# Patient Record
Sex: MALE | Race: WHITE
[De-identification: names, ages, dates, MRNs, and addresses within clinical notes are randomized per-mention and may not be internally consistent; named-entity substitution may affect disease eponyms.]

---

## 2017-04-17 ENCOUNTER — RX ONLY (RX ONLY)
Age: 37
End: 2017-04-17

## 2017-04-17 ENCOUNTER — DOCTOR'S OFFICE (OUTPATIENT)
Dept: URBAN - METROPOLITAN AREA CLINIC 137 | Facility: CLINIC | Age: 37
Setting detail: OPHTHALMOLOGY
End: 2017-04-17
Payer: COMMERCIAL

## 2017-04-17 DIAGNOSIS — H52.03: ICD-10-CM

## 2017-04-17 PROCEDURE — 92004 COMPRE OPH EXAM NEW PT 1/>: CPT | Performed by: OPHTHALMOLOGY

## 2017-04-17 ASSESSMENT — REFRACTION_OUTSIDERX
OU_VA: 20/
OD_VA3: 20/
OS_VA2: 20/20(J1+)
OD_CYLINDER: +0.50
OD_VA2: 20/20(J1+)
OD_VA1: 20/20
OD_SPHERE: +0.75
OS_VA3: 20/
OS_CYLINDER: +0.50
OS_AXIS: 135
OS_VA1: 20/20
OS_SPHERE: +1.75
OD_AXIS: 045

## 2017-04-17 ASSESSMENT — AXIALLENGTH_DERIVED
OD_AL: 23.3877
OS_AL: 22.8376

## 2017-04-17 ASSESSMENT — CONFRONTATIONAL VISUAL FIELD TEST (CVF)
OD_FINDINGS: FULL
OS_FINDINGS: FULL

## 2017-04-17 ASSESSMENT — VISUAL ACUITY
OS_BCVA: 20/30-2
OD_BCVA: 20/25-2

## 2017-04-17 ASSESSMENT — REFRACTION_CURRENTRX
OS_OVR_VA: 20/
OD_OVR_VA: 20/
OS_OVR_VA: 20/
OD_OVR_VA: 20/
OS_OVR_VA: 20/
OD_OVR_VA: 20/

## 2017-04-17 ASSESSMENT — REFRACTION_AUTOREFRACTION
OD_CYLINDER: +0.50
OS_AXIS: 134
OS_SPHERE: +1.75
OD_SPHERE: +0.75
OD_AXIS: 043
OS_CYLINDER: +0.50

## 2017-04-17 ASSESSMENT — KERATOMETRY
OS_K1POWER_DIOPTERS: 43.00
OS_AXISANGLE_DEGREES: 125
OD_AXISANGLE_DEGREES: 061
OD_K1POWER_DIOPTERS: 42.50
OD_K2POWER_DIOPTERS: 43.50
OS_K2POWER_DIOPTERS: 44.00

## 2017-04-17 ASSESSMENT — REFRACTION_MANIFEST
OS_VA2: 20/
OD_VA1: 20/
OS_VA1: 20/
OU_VA: 20/
OU_VA: 20/
OD_VA1: 20/
OD_VA2: 20/
OD_VA2: 20/
OD_VA3: 20/
OS_VA1: 20/
OS_VA3: 20/
OD_VA3: 20/
OS_VA2: 20/
OS_VA3: 20/

## 2017-04-17 ASSESSMENT — SPHEQUIV_DERIVED
OS_SPHEQUIV: 2
OD_SPHEQUIV: 1

## 2019-03-08 ENCOUNTER — APPOINTMENT (EMERGENCY)
Dept: CT IMAGING | Facility: HOSPITAL | Age: 39
DRG: 392 | End: 2019-03-08
Payer: COMMERCIAL

## 2019-03-08 ENCOUNTER — HOSPITAL ENCOUNTER (INPATIENT)
Facility: HOSPITAL | Age: 39
LOS: 5 days | Discharge: HOME | DRG: 392 | End: 2019-03-13
Attending: EMERGENCY MEDICINE | Admitting: SURGERY
Payer: COMMERCIAL

## 2019-03-08 DIAGNOSIS — K63.1 PERFORATED SIGMOID COLON (HCC): ICD-10-CM

## 2019-03-08 DIAGNOSIS — K57.20 DIVERTICULITIS OF LARGE INTESTINE WITH PERFORATION WITHOUT BLEEDING: Primary | ICD-10-CM

## 2019-03-08 DIAGNOSIS — R10.9 ABDOMINAL PAIN: ICD-10-CM

## 2019-03-08 DIAGNOSIS — K57.92 DIVERTICULITIS: ICD-10-CM

## 2019-03-08 LAB
ALBUMIN SERPL BCP-MCNC: 3.1 G/DL (ref 3.5–5)
ALP SERPL-CCNC: 54 U/L (ref 46–116)
ALT SERPL W P-5'-P-CCNC: 27 U/L (ref 12–78)
ANION GAP SERPL CALCULATED.3IONS-SCNC: 7 MMOL/L (ref 4–13)
AST SERPL W P-5'-P-CCNC: 13 U/L (ref 5–45)
BACTERIA UR QL AUTO: ABNORMAL /HPF
BASOPHILS # BLD AUTO: 0.04 THOUSANDS/ΜL (ref 0–0.1)
BASOPHILS NFR BLD AUTO: 0 % (ref 0–1)
BILIRUB SERPL-MCNC: 0.5 MG/DL (ref 0.2–1)
BILIRUB UR QL STRIP: NEGATIVE
BUN SERPL-MCNC: 15 MG/DL (ref 5–25)
CALCIUM SERPL-MCNC: 8.7 MG/DL (ref 8.3–10.1)
CHLORIDE SERPL-SCNC: 102 MMOL/L (ref 100–108)
CLARITY UR: CLEAR
CO2 SERPL-SCNC: 29 MMOL/L (ref 21–32)
COLOR UR: YELLOW
CREAT SERPL-MCNC: 0.82 MG/DL (ref 0.6–1.3)
EOSINOPHIL # BLD AUTO: 0.28 THOUSAND/ΜL (ref 0–0.61)
EOSINOPHIL NFR BLD AUTO: 2 % (ref 0–6)
ERYTHROCYTE [DISTWIDTH] IN BLOOD BY AUTOMATED COUNT: 14.2 % (ref 11.6–15.1)
GFR SERPL CREATININE-BSD FRML MDRD: 111 ML/MIN/1.73SQ M
GLUCOSE SERPL-MCNC: 105 MG/DL (ref 65–140)
GLUCOSE UR STRIP-MCNC: NEGATIVE MG/DL
HCT VFR BLD AUTO: 43.3 % (ref 36.5–49.3)
HGB BLD-MCNC: 14.3 G/DL (ref 12–17)
HGB UR QL STRIP.AUTO: ABNORMAL
IMM GRANULOCYTES # BLD AUTO: 0.06 THOUSAND/UL (ref 0–0.2)
IMM GRANULOCYTES NFR BLD AUTO: 0 % (ref 0–2)
KETONES UR STRIP-MCNC: NEGATIVE MG/DL
LEUKOCYTE ESTERASE UR QL STRIP: NEGATIVE
LIPASE SERPL-CCNC: 73 U/L (ref 73–393)
LYMPHOCYTES # BLD AUTO: 1.22 THOUSANDS/ΜL (ref 0.6–4.47)
LYMPHOCYTES NFR BLD AUTO: 9 % (ref 14–44)
MCH RBC QN AUTO: 30.6 PG (ref 26.8–34.3)
MCHC RBC AUTO-ENTMCNC: 33 G/DL (ref 31.4–37.4)
MCV RBC AUTO: 93 FL (ref 82–98)
MONOCYTES # BLD AUTO: 1.05 THOUSAND/ΜL (ref 0.17–1.22)
MONOCYTES NFR BLD AUTO: 8 % (ref 4–12)
NEUTROPHILS # BLD AUTO: 11.4 THOUSANDS/ΜL (ref 1.85–7.62)
NEUTS SEG NFR BLD AUTO: 81 % (ref 43–75)
NITRITE UR QL STRIP: NEGATIVE
NON-SQ EPI CELLS URNS QL MICRO: ABNORMAL /HPF
NRBC BLD AUTO-RTO: 0 /100 WBCS
PH UR STRIP.AUTO: 6.5 [PH]
PLATELET # BLD AUTO: 297 THOUSANDS/UL (ref 149–390)
PMV BLD AUTO: 9.6 FL (ref 8.9–12.7)
POTASSIUM SERPL-SCNC: 3.9 MMOL/L (ref 3.5–5.3)
PROT SERPL-MCNC: 7 G/DL (ref 6.4–8.2)
PROT UR STRIP-MCNC: NEGATIVE MG/DL
RBC # BLD AUTO: 4.67 MILLION/UL (ref 3.88–5.62)
RBC #/AREA URNS AUTO: ABNORMAL /HPF
SODIUM SERPL-SCNC: 138 MMOL/L (ref 136–145)
SP GR UR STRIP.AUTO: 1.01 (ref 1–1.03)
UROBILINOGEN UR QL STRIP.AUTO: 0.2 E.U./DL
WBC # BLD AUTO: 14.05 THOUSAND/UL (ref 4.31–10.16)
WBC #/AREA URNS AUTO: ABNORMAL /HPF

## 2019-03-08 PROCEDURE — 81001 URINALYSIS AUTO W/SCOPE: CPT | Performed by: EMERGENCY MEDICINE

## 2019-03-08 PROCEDURE — 85025 COMPLETE CBC W/AUTO DIFF WBC: CPT | Performed by: EMERGENCY MEDICINE

## 2019-03-08 PROCEDURE — 96374 THER/PROPH/DIAG INJ IV PUSH: CPT

## 2019-03-08 PROCEDURE — 74177 CT ABD & PELVIS W/CONTRAST: CPT

## 2019-03-08 PROCEDURE — 80053 COMPREHEN METABOLIC PANEL: CPT | Performed by: EMERGENCY MEDICINE

## 2019-03-08 PROCEDURE — 96361 HYDRATE IV INFUSION ADD-ON: CPT

## 2019-03-08 PROCEDURE — 83690 ASSAY OF LIPASE: CPT | Performed by: EMERGENCY MEDICINE

## 2019-03-08 PROCEDURE — 36415 COLL VENOUS BLD VENIPUNCTURE: CPT | Performed by: EMERGENCY MEDICINE

## 2019-03-08 PROCEDURE — 99285 EMERGENCY DEPT VISIT HI MDM: CPT

## 2019-03-08 RX ORDER — HYDROMORPHONE HCL/PF 1 MG/ML
0.5 SYRINGE (ML) INJECTION
Status: DISCONTINUED | OUTPATIENT
Start: 2019-03-08 | End: 2019-03-13 | Stop reason: HOSPADM

## 2019-03-08 RX ORDER — ONDANSETRON 2 MG/ML
4 INJECTION INTRAMUSCULAR; INTRAVENOUS EVERY 6 HOURS PRN
Status: DISCONTINUED | OUTPATIENT
Start: 2019-03-08 | End: 2019-03-13 | Stop reason: HOSPADM

## 2019-03-08 RX ORDER — HYDROMORPHONE HCL/PF 1 MG/ML
1 SYRINGE (ML) INJECTION
Status: DISCONTINUED | OUTPATIENT
Start: 2019-03-08 | End: 2019-03-13 | Stop reason: HOSPADM

## 2019-03-08 RX ORDER — NICOTINE 21 MG/24HR
1 PATCH, TRANSDERMAL 24 HOURS TRANSDERMAL DAILY
Status: DISCONTINUED | OUTPATIENT
Start: 2019-03-08 | End: 2019-03-13 | Stop reason: HOSPADM

## 2019-03-08 RX ORDER — ACETAMINOPHEN 325 MG/1
650 TABLET ORAL EVERY 6 HOURS PRN
Status: DISCONTINUED | OUTPATIENT
Start: 2019-03-08 | End: 2019-03-13 | Stop reason: HOSPADM

## 2019-03-08 RX ORDER — CEFAZOLIN SODIUM 2 G/50ML
2000 SOLUTION INTRAVENOUS ONCE
Status: DISCONTINUED | OUTPATIENT
Start: 2019-03-08 | End: 2019-03-08

## 2019-03-08 RX ORDER — HYDROMORPHONE HCL/PF 1 MG/ML
1 SYRINGE (ML) INJECTION ONCE
Status: COMPLETED | OUTPATIENT
Start: 2019-03-08 | End: 2019-03-08

## 2019-03-08 RX ORDER — SODIUM CHLORIDE, SODIUM LACTATE, POTASSIUM CHLORIDE, CALCIUM CHLORIDE 600; 310; 30; 20 MG/100ML; MG/100ML; MG/100ML; MG/100ML
100 INJECTION, SOLUTION INTRAVENOUS CONTINUOUS
Status: DISCONTINUED | OUTPATIENT
Start: 2019-03-08 | End: 2019-03-13 | Stop reason: HOSPADM

## 2019-03-08 RX ADMIN — NICOTINE 1 PATCH: 14 PATCH TRANSDERMAL at 16:13

## 2019-03-08 RX ADMIN — SODIUM CHLORIDE, SODIUM LACTATE, POTASSIUM CHLORIDE, AND CALCIUM CHLORIDE 125 ML/HR: .6; .31; .03; .02 INJECTION, SOLUTION INTRAVENOUS at 16:14

## 2019-03-08 RX ADMIN — HYDROMORPHONE HYDROCHLORIDE 1 MG: 1 INJECTION, SOLUTION INTRAMUSCULAR; INTRAVENOUS; SUBCUTANEOUS at 19:24

## 2019-03-08 RX ADMIN — HYDROMORPHONE HYDROCHLORIDE 1 MG: 1 INJECTION, SOLUTION INTRAMUSCULAR; INTRAVENOUS; SUBCUTANEOUS at 13:59

## 2019-03-08 RX ADMIN — SODIUM CHLORIDE 1000 ML: 0.9 INJECTION, SOLUTION INTRAVENOUS at 13:21

## 2019-03-08 RX ADMIN — IOHEXOL 100 ML: 350 INJECTION, SOLUTION INTRAVENOUS at 14:11

## 2019-03-08 RX ADMIN — SODIUM CHLORIDE 3 G: 9 INJECTION, SOLUTION INTRAVENOUS at 20:13

## 2019-03-08 RX ADMIN — ENOXAPARIN SODIUM 40 MG: 40 INJECTION SUBCUTANEOUS at 16:13

## 2019-03-08 RX ADMIN — CEFAZOLIN SODIUM 2000 MG: 2 SOLUTION INTRAVENOUS at 16:52

## 2019-03-08 RX ADMIN — METRONIDAZOLE 500 MG: 500 INJECTION, SOLUTION INTRAVENOUS at 15:31

## 2019-03-08 NOTE — H&P
History and Physical -General Surgery  Clark Chase 35 y o  male MRN: [id number]  Unit/Bed#: ED 29 Encounter: [id number]        Reason for Consult / Principal Problem: abdominal pain    HPI: Clark Chase is a 35y o  year old male with no significant pmh who presents with abdominal pain  Pt states he began having nausea and generalized abdominal pain on Sunday  Has also been having diarrhea  Continued with nausea throughout the week until Thursday when pain stopped  He did have temps of 101 1F this week  Appetite has been low  Pain returned this morning in LLQ  Has also been having burning with urination along with feeling of not emptying  Does state he's been having crampy abdominal pain for a few months  He has never had a colonoscopy  Has never been told he had diverticulitis  Denies abdominal surgical history  Review of Systems   Constitutional: Positive for appetite change and fever  HENT: Negative for congestion and sore throat  Gastrointestinal: Positive for abdominal pain, diarrhea and nausea  Negative for abdominal distention and constipation  Genitourinary: Positive for difficulty urinating  Skin: Negative for rash and wound  Hematological: Does not bruise/bleed easily  Psychiatric/Behavioral: Negative for behavioral problems, confusion and decreased concentration  Historical Information   History reviewed  No pertinent past medical history  History reviewed  No pertinent surgical history  Social History   Social History     Substance and Sexual Activity   Alcohol Use Yes    Comment: social     Social History     Substance and Sexual Activity   Drug Use Never     Social History     Tobacco Use   Smoking Status Current Every Day Smoker   Smokeless Tobacco Never Used     History reviewed  No pertinent family history      Meds/Allergies       (Not in a hospital admission)  Current Facility-Administered Medications   Medication Dose Route Frequency    ceFAZolin (ANCEF) IVPB (premix) 2,000 mg  2,000 mg Intravenous Once    metroNIDAZOLE (FLAGYL) IVPB (premix) 500 mg  500 mg Intravenous Once       No Known Allergies    Objective     Blood pressure 146/82, pulse 92, temperature 99 3 °F (37 4 °C), resp  rate 18, weight 120 kg (263 lb 10 7 oz), SpO2 98 %    No intake or output data in the 24 hours ending 03/08/19 1546    PHYSICAL EXAM  General appearance: alert and oriented, in no acute distress  Skin: Skin color, texture, turgor normal  No rashes or lesions  Heart: regular rate and rhythm, S1, S2 normal, no murmur, click, rub or gallop  Lungs: clear to auscultation bilaterally  Abdomen: soft, bowel sounds +, very tender to palpation LLQ  Neurological: aaox3, speech normal    Lab Results:   Admission on 03/08/2019   Component Date Value    Sodium 03/08/2019 138     Potassium 03/08/2019 3 9     Chloride 03/08/2019 102     CO2 03/08/2019 29     ANION GAP 03/08/2019 7     BUN 03/08/2019 15     Creatinine 03/08/2019 0 82     Glucose 03/08/2019 105     Calcium 03/08/2019 8 7     AST 03/08/2019 13     ALT 03/08/2019 27     Alkaline Phosphatase 03/08/2019 54     Total Protein 03/08/2019 7 0     Albumin 03/08/2019 3 1*    Total Bilirubin 03/08/2019 0 50     eGFR 03/08/2019 111     WBC 03/08/2019 14 05*    RBC 03/08/2019 4 67     Hemoglobin 03/08/2019 14 3     Hematocrit 03/08/2019 43 3     MCV 03/08/2019 93     MCH 03/08/2019 30 6     MCHC 03/08/2019 33 0     RDW 03/08/2019 14 2     MPV 03/08/2019 9 6     Platelets 81/12/2630 297     nRBC 03/08/2019 0     Neutrophils Relative 03/08/2019 81*    Immat GRANS % 03/08/2019 0     Lymphocytes Relative 03/08/2019 9*    Monocytes Relative 03/08/2019 8     Eosinophils Relative 03/08/2019 2     Basophils Relative 03/08/2019 0     Neutrophils Absolute 03/08/2019 11 40*    Immature Grans Absolute 03/08/2019 0 06     Lymphocytes Absolute 03/08/2019 1 22     Monocytes Absolute 03/08/2019 1 05     Eosinophils Absolute 03/08/2019 0 28     Basophils Absolute 03/08/2019 0 04     Lipase 03/08/2019 73      Imaging Studies: I have personally reviewed pertinent reports  ASSESSMENT/PLAN:    35year old male presenting with diverticulitis with perforation, no abscess collection    - IV Unasyn  - pain control  - NPO/IVF  - OOB as able  - tylenol for fevers      This patient will require  2 night hospital stay  Counseling / Coordination of Care  Total time spent today  20 minutes  Greater than 50% of total time was spent with the patient and / or family counseling and / or coordination of care       Grzegorz Gilliland PA-C

## 2019-03-08 NOTE — ED PROVIDER NOTES
History  Chief Complaint   Patient presents with    Abdominal Pain     generalized abd pain X 5 days , sent from Urgent Care for further evaluation, difficulty urinating and contstipated     Patient presents to the emergency department after being seen at a local urgent care facility for evaluation of increasing left lower quadrant pain that began on Sunday  Patient denies nausea vomiting diarrhea  Denies urinary symptoms  Denies fever chills or rash  Denies chest pain sob  Denies trauma fall injury or new activity  Denies ill contacts  Denies melena or rectal bleeding  Denies urinary symptoms or penile discharge  None       History reviewed  No pertinent past medical history  History reviewed  No pertinent surgical history  History reviewed  No pertinent family history  I have reviewed and agree with the history as documented  Social History     Tobacco Use    Smoking status: Current Every Day Smoker    Smokeless tobacco: Never Used   Substance Use Topics    Alcohol use: Yes     Comment: social    Drug use: Never        Review of Systems   Constitutional: Negative  Negative for activity change, appetite change, chills, diaphoresis, fatigue and fever  HENT: Negative  Negative for congestion, drooling, ear discharge, ear pain, rhinorrhea, sinus pressure, sinus pain, tinnitus, trouble swallowing and voice change  Eyes: Negative  Negative for photophobia and visual disturbance  Respiratory: Negative  Negative for cough, chest tightness, shortness of breath and stridor  Cardiovascular: Negative  Negative for chest pain, palpitations and leg swelling  Gastrointestinal: Positive for abdominal pain  Negative for abdominal distention, anal bleeding, blood in stool, constipation, diarrhea, nausea, rectal pain and vomiting  Endocrine: Negative  Genitourinary: Negative    Negative for decreased urine volume, difficulty urinating, discharge, dysuria, flank pain, frequency, hematuria, penile pain, penile swelling, scrotal swelling, testicular pain and urgency  Musculoskeletal: Negative  Negative for arthralgias, back pain, neck pain and neck stiffness  Skin: Negative  Negative for rash and wound  Allergic/Immunologic: Negative  Neurological: Negative  Negative for dizziness, tremors, seizures, syncope, facial asymmetry, speech difficulty, weakness, light-headedness, numbness and headaches  Hematological: Negative  Does not bruise/bleed easily  Psychiatric/Behavioral: Negative  Negative for confusion  Physical Exam  Physical Exam   Constitutional: He is oriented to person, place, and time  He appears well-developed and well-nourished  Non-toxic appearance  He does not appear ill  No distress  HENT:   Head: Normocephalic and atraumatic  Right Ear: External ear normal    Left Ear: External ear normal    Mouth/Throat: Oropharynx is clear and moist  No oropharyngeal exudate  Eyes: Pupils are equal, round, and reactive to light  Conjunctivae and EOM are normal  No scleral icterus  Cardiovascular: Normal rate, regular rhythm, normal heart sounds and intact distal pulses  Exam reveals no gallop and no friction rub  No murmur heard  Pulmonary/Chest: Effort normal and breath sounds normal  No stridor  No respiratory distress  He has no wheezes  He has no rhonchi  He has no rales  He exhibits no tenderness  Abdominal: Soft  Normal appearance and bowel sounds are normal  He exhibits no shifting dullness, no distension, no pulsatile liver, no fluid wave, no abdominal bruit, no ascites, no pulsatile midline mass and no mass  There is no hepatosplenomegaly, splenomegaly or hepatomegaly  There is tenderness in the left lower quadrant  There is no rigidity, no rebound, no guarding, no CVA tenderness, no tenderness at McBurney's point and negative Breaux's sign  No hernia  Moderate left lower quadrant tenderness to palpation    No masses hernias or peritoneal signs Musculoskeletal: Normal range of motion  Neurological: He is alert and oriented to person, place, and time  He has normal reflexes  Skin: Skin is warm and dry  No rash noted  He is not diaphoretic  No cyanosis or erythema  No pallor  Psychiatric: He has a normal mood and affect  His behavior is normal    Nursing note and vitals reviewed        Vital Signs  ED Triage Vitals [03/08/19 1244]   Temperature Pulse Respirations Blood Pressure SpO2   99 3 °F (37 4 °C) 92 18 146/82 98 %      Temp src Heart Rate Source Patient Position - Orthostatic VS BP Location FiO2 (%)   -- -- -- Right arm --      Pain Score       8           Vitals:    03/08/19 1244   BP: 146/82   Pulse: 92       Visual Acuity      ED Medications  Medications   metroNIDAZOLE (FLAGYL) IVPB (premix) 500 mg (has no administration in time range)   ceFAZolin (ANCEF) IVPB (premix) 2,000 mg (has no administration in time range)   sodium chloride 0 9 % bolus 1,000 mL (0 mL Intravenous Stopped 3/8/19 1421)   HYDROmorphone (DILAUDID) injection 1 mg (1 mg Intravenous Given 3/8/19 1359)   iohexol (OMNIPAQUE) 350 MG/ML injection (MULTI-DOSE) 100 mL (100 mL Intravenous Given 3/8/19 1411)       Diagnostic Studies  Results Reviewed     Procedure Component Value Units Date/Time    Comprehensive metabolic panel [[id number]]  (Abnormal) Collected:  03/08/19 1321    Lab Status:  Final result Specimen:  Blood from Arm, Right Updated:  03/08/19 1346     Sodium 138 mmol/L      Potassium 3 9 mmol/L      Chloride 102 mmol/L      CO2 29 mmol/L      ANION GAP 7 mmol/L      BUN 15 mg/dL      Creatinine 0 82 mg/dL      Glucose 105 mg/dL      Calcium 8 7 mg/dL      AST 13 U/L      ALT 27 U/L      Alkaline Phosphatase 54 U/L      Total Protein 7 0 g/dL      Albumin 3 1 g/dL      Total Bilirubin 0 50 mg/dL      eGFR 111 ml/min/1 73sq m     Narrative:       National Kidney Disease Education Program recommendations are as follows:  GFR calculation is accurate only with a steady state creatinine  Chronic Kidney disease less than 60 ml/min/1 73 sq  meters  Kidney failure less than 15 ml/min/1 73 sq  meters  Lipase [[id number]]  (Normal) Collected:  03/08/19 1321    Lab Status:  Final result Specimen:  Blood from Arm, Right Updated:  03/08/19 1346     Lipase 73 u/L     CBC and differential [[id number]]  (Abnormal) Collected:  03/08/19 1321    Lab Status:  Final result Specimen:  Blood from Arm, Right Updated:  03/08/19 1326     WBC 14 05 Thousand/uL      RBC 4 67 Million/uL      Hemoglobin 14 3 g/dL      Hematocrit 43 3 %      MCV 93 fL      MCH 30 6 pg      MCHC 33 0 g/dL      RDW 14 2 %      MPV 9 6 fL      Platelets 447 Thousands/uL      nRBC 0 /100 WBCs      Neutrophils Relative 81 %      Immat GRANS % 0 %      Lymphocytes Relative 9 %      Monocytes Relative 8 %      Eosinophils Relative 2 %      Basophils Relative 0 %      Neutrophils Absolute 11 40 Thousands/µL      Immature Grans Absolute 0 06 Thousand/uL      Lymphocytes Absolute 1 22 Thousands/µL      Monocytes Absolute 1 05 Thousand/µL      Eosinophils Absolute 0 28 Thousand/µL      Basophils Absolute 0 04 Thousands/µL     UA w Reflex to Microscopic [[id number]]     Lab Status:  No result Specimen:  Urine                  CT abdomen pelvis with contrast    (Results Pending)              Procedures  Procedures       Phone Contacts  ED Phone Contact    ED Course  ED Course as of Mar 08 1521   Fri Mar 08, 2019   1510 Discussed case with Ford Duff the surgical resident who will evaluate patient in the emergency department  Patient will receive IV antibiotics and be admitted  1518 Patient will be admitted to Dr Oliver's service                                     MDM    Disposition  Final diagnoses:   Abdominal pain   Diverticulitis   Perforated sigmoid colon ([facility] [address] )     Time reflects when diagnosis was documented in both MDM as applicable and the Disposition within this note     Time User Action Codes Description Comment    3/8/2019 3:19 PM Juan Alberto Gipson Add [R10 9] Abdominal pain     3/8/2019  3:19 PM Peter Alexander Add [K57 92] Diverticulitis     3/8/2019  3:19 PM Kaitlyn Gipson Add [K63 1] Perforated sigmoid colon [facility])       ED Disposition     ED Disposition Condition Date/Time Comment    Admit Stable Fri Mar 8, 2019  3:19 PM Case was discussed with Cornell Mahaarj Surgical Residentand the patient's admission status was agreed to be Admission Status: inpatient status to the service of Dr Toby Lima    None         Patient's Medications    No medications on file     No discharge procedures on file      ED Provider  Electronically Signed by           Lawrence Feliz MD  03/08/19 1595

## 2019-03-08 NOTE — ED NOTES
Pt states he cannot provide urine sample at this time, however gave sample to  earlier, results in chart        Gretchen Cardenas, PHANI  03/08/19 6943

## 2019-03-09 LAB
ANION GAP SERPL CALCULATED.3IONS-SCNC: 10 MMOL/L (ref 4–13)
BUN SERPL-MCNC: 9 MG/DL (ref 5–25)
CALCIUM SERPL-MCNC: 8.5 MG/DL (ref 8.3–10.1)
CHLORIDE SERPL-SCNC: 102 MMOL/L (ref 100–108)
CO2 SERPL-SCNC: 26 MMOL/L (ref 21–32)
CREAT SERPL-MCNC: 0.79 MG/DL (ref 0.6–1.3)
ERYTHROCYTE [DISTWIDTH] IN BLOOD BY AUTOMATED COUNT: 14.2 % (ref 11.6–15.1)
GFR SERPL CREATININE-BSD FRML MDRD: 112 ML/MIN/1.73SQ M
GLUCOSE SERPL-MCNC: 108 MG/DL (ref 65–140)
HCT VFR BLD AUTO: 40.3 % (ref 36.5–49.3)
HGB BLD-MCNC: 13.5 G/DL (ref 12–17)
MCH RBC QN AUTO: 30.4 PG (ref 26.8–34.3)
MCHC RBC AUTO-ENTMCNC: 33.5 G/DL (ref 31.4–37.4)
MCV RBC AUTO: 91 FL (ref 82–98)
PLATELET # BLD AUTO: 280 THOUSANDS/UL (ref 149–390)
PMV BLD AUTO: 9.8 FL (ref 8.9–12.7)
POTASSIUM SERPL-SCNC: 3.4 MMOL/L (ref 3.5–5.3)
RBC # BLD AUTO: 4.44 MILLION/UL (ref 3.88–5.62)
SODIUM SERPL-SCNC: 138 MMOL/L (ref 136–145)
WBC # BLD AUTO: 11.68 THOUSAND/UL (ref 4.31–10.16)

## 2019-03-09 PROCEDURE — 85027 COMPLETE CBC AUTOMATED: CPT | Performed by: PHYSICIAN ASSISTANT

## 2019-03-09 PROCEDURE — 80048 BASIC METABOLIC PNL TOTAL CA: CPT | Performed by: PHYSICIAN ASSISTANT

## 2019-03-09 RX ORDER — POTASSIUM CHLORIDE 20 MEQ/1
40 TABLET, EXTENDED RELEASE ORAL ONCE
Status: COMPLETED | OUTPATIENT
Start: 2019-03-09 | End: 2019-03-09

## 2019-03-09 RX ADMIN — SODIUM CHLORIDE 3 G: 9 INJECTION, SOLUTION INTRAVENOUS at 02:31

## 2019-03-09 RX ADMIN — HYDROMORPHONE HYDROCHLORIDE 1 MG: 1 INJECTION, SOLUTION INTRAMUSCULAR; INTRAVENOUS; SUBCUTANEOUS at 14:26

## 2019-03-09 RX ADMIN — SODIUM CHLORIDE 3 G: 9 INJECTION, SOLUTION INTRAVENOUS at 14:25

## 2019-03-09 RX ADMIN — SODIUM CHLORIDE, SODIUM LACTATE, POTASSIUM CHLORIDE, AND CALCIUM CHLORIDE 125 ML/HR: .6; .31; .03; .02 INJECTION, SOLUTION INTRAVENOUS at 09:19

## 2019-03-09 RX ADMIN — SODIUM CHLORIDE 3 G: 9 INJECTION, SOLUTION INTRAVENOUS at 19:34

## 2019-03-09 RX ADMIN — SODIUM CHLORIDE 3 G: 9 INJECTION, SOLUTION INTRAVENOUS at 09:15

## 2019-03-09 RX ADMIN — HYDROMORPHONE HYDROCHLORIDE 1 MG: 1 INJECTION, SOLUTION INTRAMUSCULAR; INTRAVENOUS; SUBCUTANEOUS at 00:34

## 2019-03-09 RX ADMIN — SODIUM CHLORIDE, SODIUM LACTATE, POTASSIUM CHLORIDE, AND CALCIUM CHLORIDE 125 ML/HR: .6; .31; .03; .02 INJECTION, SOLUTION INTRAVENOUS at 01:58

## 2019-03-09 RX ADMIN — SODIUM CHLORIDE, SODIUM LACTATE, POTASSIUM CHLORIDE, AND CALCIUM CHLORIDE 125 ML/HR: .6; .31; .03; .02 INJECTION, SOLUTION INTRAVENOUS at 17:53

## 2019-03-09 RX ADMIN — NICOTINE 1 PATCH: 14 PATCH TRANSDERMAL at 09:18

## 2019-03-09 RX ADMIN — HYDROMORPHONE HYDROCHLORIDE 1 MG: 1 INJECTION, SOLUTION INTRAMUSCULAR; INTRAVENOUS; SUBCUTANEOUS at 11:35

## 2019-03-09 RX ADMIN — HYDROMORPHONE HYDROCHLORIDE 1 MG: 1 INJECTION, SOLUTION INTRAMUSCULAR; INTRAVENOUS; SUBCUTANEOUS at 17:52

## 2019-03-09 RX ADMIN — POTASSIUM CHLORIDE 40 MEQ: 1500 TABLET, EXTENDED RELEASE ORAL at 09:15

## 2019-03-09 RX ADMIN — HYDROMORPHONE HYDROCHLORIDE 1 MG: 1 INJECTION, SOLUTION INTRAMUSCULAR; INTRAVENOUS; SUBCUTANEOUS at 06:43

## 2019-03-09 RX ADMIN — ENOXAPARIN SODIUM 40 MG: 40 INJECTION SUBCUTANEOUS at 09:16

## 2019-03-09 RX ADMIN — HYDROMORPHONE HYDROCHLORIDE 1 MG: 1 INJECTION, SOLUTION INTRAMUSCULAR; INTRAVENOUS; SUBCUTANEOUS at 21:35

## 2019-03-09 NOTE — UTILIZATION REVIEW
Initial Clinical Review    Admission: Date/Time/Statement: Inpatient 3/8/19 @ 1520   Orders Placed This Encounter   Procedures    Inpatient Admission     Standing Status:   Standing     Number of Occurrences:   1     Order Specific Question:   Admitting Physician     Answer:   Floyd Macias [[id number]]     Order Specific Question:   Level of Care     Answer:   Med Surg [[id number]]     Order Specific Question:   Estimated length of stay     Answer:   More than 2 Midnights     Order Specific Question:   Certification     Answer:   I certify that inpatient services are medically necessary for this patient for a duration of greater than two midnights  See H&P and MD Progress Notes for additional information about the patient's course of treatment  ED: Date/Time/Mode of Arrival:   ED Arrival Information     Expected Arrival Acuity Means of Arrival Escorted By Service Admission Type    - 3/8/2019 12:25 Urgent Walk-In Family Member Surgery-General Urgent    Arrival Complaint    Abdominal Pain        Chief Complaint:   Chief Complaint   Patient presents with    Abdominal Pain     generalized abd pain X 5 days , sent from Urgent Care for further evaluation, difficulty urinating and contstipated     Assessment/Plan: 35 y o  Male with abdominal pain for 5 days  Sent from Urgent Care facility  Continues with nausea through week, poor PO intake  This am acutely pain to Left lower quadrant returned with burning on urination/not feeling empty  He sought evaluation  Inpatient admission workup reveals diverticulitis with perforation  IV antibiotics, pain control, NPO, IV fluids  OOB as tolerated and medication for fever control  Appreciate Surgical input       ED Vital Signs:   ED Triage Vitals   Temperature Pulse Respirations Blood Pressure SpO2   03/08/19 1244 03/08/19 1244 03/08/19 1244 03/08/19 1244 03/08/19 1244   99 3 °F (37 4 °C) 92 18 146/82 98 %      Temp Source Heart Rate Source Patient Position - Orthostatic VS BP Location FiO2 (%)   03/08/19 1554 03/08/19 1544 03/08/19 1544 03/08/19 1244 --   Oral Monitor Lying Right arm       Pain Score       03/08/19 1244       8        Wt Readings from Last 1 Encounters:   03/08/19 115 kg (253 lb 4 9 oz)     Vital Signs (abnormal): none    Pertinent Labs/Diagnostic Test Results: 3/8/2019 albumin 3 1, wbc 14 05,   CT ABDOMEN PELVIS with contrast: Perforated acute diverticulitis  03/09/2019 potassium 3 4, wbc 11 68,     ED Treatment:   Medication Administration from 03/08/2019 1225 to 03/08/2019 1552       Date/Time Order Dose Route Action     03/08/2019 1421 sodium chloride 0 9 % bolus 1,000 mL 0 mL Intravenous Stopped     03/08/2019 1321 sodium chloride 0 9 % bolus 1,000 mL 1,000 mL Intravenous New Bag     03/08/2019 1359 HYDROmorphone (DILAUDID) injection 1 mg 1 mg Intravenous Given     03/08/2019 1531 metroNIDAZOLE (FLAGYL) IVPB (premix) 500 mg 500 mg Intravenous New Bag        Past Medical/Surgical History:    Active Ambulatory Problems     Diagnosis Date Noted    No Active Ambulatory Problems       No Additional Past Medical History     Admitting Diagnosis: Diverticulitis [K57 92]  Abdominal pain [R10 9]  Perforated sigmoid colon ([facility] [address] ) [K63 1]     Age/Sex: 35 y o  male     Admission Orders:  Peripheral IV  Inpatient to Surgery  I/O   Vitals routine  Clear liquid diet  Up as tolerated    Scheduled Meds:   Current Facility-Administered Medications:  acetaminophen 650 mg Oral Q6H PRN Samuel Oliver DO    ampicillin-sulbactam 3 g Intravenous Q6H Samuel Oliver DO Last Rate: 3 g (03/09/19 1425)   enoxaparin 40 mg Subcutaneous Daily Samuel Oliver DO    HYDROmorphone 0 5 mg Intravenous Q3H PRN Samuel Oliver DO    HYDROmorphone 1 mg Intravenous Q3H PRN Samuel Oliver DO    lactated ringers 125 mL/hr Intravenous Continuous Samuel Oliver DO Last Rate: 125 mL/hr (03/09/19 1753)   nicotine 1 patch Transdermal Daily Samuel Oliver DO    ondansetron 4 mg Intravenous Q6H PRN Samuel Oliver DO Continuous Infusions:   lactated ringers 125 mL/hr Last Rate: 125 mL/hr (03/09/19 1753)     Network Utilization Review Department  Phone: [phone]; Fax [phone]  [email or web address]  ATTENTION: Please call with any questions or concerns to [phone]  and carefully listen to the prompts so that you are directed to the right person  Send all requests for admission clinical reviews, approved or denied determinations and any other requests to fax [phone]   All voicemails are confidential

## 2019-03-09 NOTE — PLAN OF CARE
Problem: Potential for Falls  Goal: Patient will remain free of falls  Description  INTERVENTIONS:  - Assess patient frequently for physical needs  -  Identify cognitive and physical deficits and behaviors that affect risk of falls    -  [place] fall precautions as indicated by assessment   - Educate patient/family on patient safety including physical limitations  - Instruct patient to call for assistance with activity based on assessment  - Modify environment to reduce risk of injury  - Consider OT/PT consult to assist with strengthening/mobility  Outcome: Progressing     Problem: Prexisting or High Potential for Compromised Skin Integrity  Goal: Skin integrity is maintained or improved  Description  INTERVENTIONS:  - Identify patients at risk for skin breakdown  - Assess and monitor skin integrity  - Assess and monitor nutrition and hydration status  - Monitor labs (i e  albumin)  - Assess for incontinence   - Turn and reposition patient  - Assist with mobility/ambulation  - Relieve pressure over bony prominences  - Avoid friction and shearing  - Provide appropriate hygiene as needed including keeping skin clean and dry  - Evaluate need for skin moisturizer/barrier cream  - Collaborate with interdisciplinary team (i e  Nutrition, Rehabilitation, etc )   - Patient/family teaching  Outcome: Progressing     Problem: PAIN - ADULT  Goal: Verbalizes/displays adequate comfort level or baseline comfort level  Description  Interventions:  - Encourage patient to monitor pain and request assistance  - Assess pain using appropriate pain scale  - Administer analgesics based on type and severity of pain and evaluate response  - Implement non-pharmacological measures as appropriate and evaluate response  - Consider cultural and social influences on pain and pain management  - Notify physician/advanced practitioner if interventions unsuccessful or patient reports new pain  Outcome: Progressing     Problem: INFECTION - ADULT  Goal: Absence or prevention of progression during hospitalization  Description  INTERVENTIONS:  - Assess and monitor for signs and symptoms of infection  - Monitor lab/diagnostic results  - Monitor all insertion sites, i e  indwelling lines, tubes, and drains  - Monitor endotracheal (as able) and nasal secretions for changes in amount and color  - [place] appropriate cooling/warming therapies per order  - Administer medications as ordered  - Instruct and encourage patient and family to use good hand hygiene technique  - Identify and instruct in appropriate isolation precautions for identified infection/condition  Outcome: Progressing  Goal: Absence of fever/infection during neutropenic period  Description  INTERVENTIONS:  - Monitor WBC  - Implement neutropenic guidelines  Outcome: Progressing     Problem: SAFETY ADULT  Goal: Maintain or return to baseline ADL function  Description  INTERVENTIONS:  -  Assess patient's ability to carry out ADLs; assess patient's baseline for ADL function and identify physical deficits which impact ability to perform ADLs (bathing, care of mouth/teeth, toileting, grooming, dressing, etc )  - Assess/evaluate cause of self-care deficits   - Assess range of motion  - Assess patient's mobility; develop plan if impaired  - Assess patient's need for assistive devices and provide as appropriate  - Encourage maximum independence but intervene and supervise when necessary  ¯ Involve family in performance of ADLs  ¯ Assess for home care needs following discharge   ¯ Request OT consult to assist with ADL evaluation and planning for discharge  ¯ Provide patient education as appropriate  Outcome: Progressing  Goal: Maintain or return mobility status to optimal level  Description  INTERVENTIONS:  - Assess patient's baseline mobility status (ambulation, transfers, stairs, etc )    - Identify cognitive and physical deficits and behaviors that affect mobility  - Identify mobility aids required to assist with transfers and/or ambulation (gait belt, sit-to-stand, lift, walker, cane, etc )  - [place] fall precautions as indicated by assessment  - Record patient progress and toleration of activity level on Mobility SBAR; progress patient to next Phase/Stage  - Instruct patient to call for assistance with activity based on assessment  - Request Rehabilitation consult to assist with strengthening/weightbearing, etc   Outcome: Progressing     Problem: DISCHARGE PLANNING  Goal: Discharge to home or other facility with appropriate resources  Description  INTERVENTIONS:  - Identify barriers to discharge w/patient and caregiver  - Arrange for needed discharge resources and transportation as appropriate  - Identify discharge learning needs (meds, wound care, etc )  - Arrange for interpretive services to assist at discharge as needed  - Refer to Case Management Department for coordinating discharge planning if the patient needs post-hospital services based on physician/advanced practitioner order or complex needs related to functional status, cognitive ability, or social support system  Outcome: Progressing     Problem: Knowledge Deficit  Goal: Patient/family/caregiver demonstrates understanding of disease process, treatment plan, medications, and discharge instructions  Description  Complete learning assessment and assess knowledge base    Interventions:  - Provide teaching at level of understanding  - Provide teaching via preferred learning methods  Outcome: Progressing

## 2019-03-09 NOTE — PLAN OF CARE
Problem: Potential for Falls  Goal: Patient will remain free of falls  Description  INTERVENTIONS:  - Assess patient frequently for physical needs  -  Identify cognitive and physical deficits and behaviors that affect risk of falls    -  [place] fall precautions as indicated by assessment   - Educate patient/family on patient safety including physical limitations  - Instruct patient to call for assistance with activity based on assessment  - Modify environment to reduce risk of injury  - Consider OT/PT consult to assist with strengthening/mobility  Outcome: Progressing     Problem: Prexisting or High Potential for Compromised Skin Integrity  Goal: Skin integrity is maintained or improved  Description  INTERVENTIONS:  - Identify patients at risk for skin breakdown  - Assess and monitor skin integrity  - Assess and monitor nutrition and hydration status  - Monitor labs (i e  albumin)  - Assess for incontinence   - Turn and reposition patient  - Assist with mobility/ambulation  - Relieve pressure over bony prominences  - Avoid friction and shearing  - Provide appropriate hygiene as needed including keeping skin clean and dry  - Evaluate need for skin moisturizer/barrier cream  - Collaborate with interdisciplinary team (i e  Nutrition, Rehabilitation, etc )   - Patient/family teaching  Outcome: Progressing     Problem: PAIN - ADULT  Goal: Verbalizes/displays adequate comfort level or baseline comfort level  Description  Interventions:  - Encourage patient to monitor pain and request assistance  - Assess pain using appropriate pain scale  - Administer analgesics based on type and severity of pain and evaluate response  - Implement non-pharmacological measures as appropriate and evaluate response  - Consider cultural and social influences on pain and pain management  - Notify physician/advanced practitioner if interventions unsuccessful or patient reports new pain  Outcome: Progressing     Problem: INFECTION - ADULT  Goal: Absence or prevention of progression during hospitalization  Description  INTERVENTIONS:  - Assess and monitor for signs and symptoms of infection  - Monitor lab/diagnostic results  - Monitor all insertion sites, i e  indwelling lines, tubes, and drains  - Monitor endotracheal (as able) and nasal secretions for changes in amount and color  - [place] appropriate cooling/warming therapies per order  - Administer medications as ordered  - Instruct and encourage patient and family to use good hand hygiene technique  - Identify and instruct in appropriate isolation precautions for identified infection/condition  Outcome: Progressing  Goal: Absence of fever/infection during neutropenic period  Description  INTERVENTIONS:  - Monitor WBC  - Implement neutropenic guidelines  Outcome: Progressing     Problem: SAFETY ADULT  Goal: Maintain or return to baseline ADL function  Description  INTERVENTIONS:  -  Assess patient's ability to carry out ADLs; assess patient's baseline for ADL function and identify physical deficits which impact ability to perform ADLs (bathing, care of mouth/teeth, toileting, grooming, dressing, etc )  - Assess/evaluate cause of self-care deficits   - Assess range of motion  - Assess patient's mobility; develop plan if impaired  - Assess patient's need for assistive devices and provide as appropriate  - Encourage maximum independence but intervene and supervise when necessary  ¯ Involve family in performance of ADLs  ¯ Assess for home care needs following discharge   ¯ Request OT consult to assist with ADL evaluation and planning for discharge  ¯ Provide patient education as appropriate  Outcome: Progressing  Goal: Maintain or return mobility status to optimal level  Description  INTERVENTIONS:  - Assess patient's baseline mobility status (ambulation, transfers, stairs, etc )    - Identify cognitive and physical deficits and behaviors that affect mobility  - Identify mobility aids required to assist with transfers and/or ambulation (gait belt, sit-to-stand, lift, walker, cane, etc )  - [place] fall precautions as indicated by assessment  - Record patient progress and toleration of activity level on Mobility SBAR; progress patient to next Phase/Stage  - Instruct patient to call for assistance with activity based on assessment  - Request Rehabilitation consult to assist with strengthening/weightbearing, etc   Outcome: Progressing     Problem: DISCHARGE PLANNING  Goal: Discharge to home or other facility with appropriate resources  Description  INTERVENTIONS:  - Identify barriers to discharge w/patient and caregiver  - Arrange for needed discharge resources and transportation as appropriate  - Identify discharge learning needs (meds, wound care, etc )  - Arrange for interpretive services to assist at discharge as needed  - Refer to Case Management Department for coordinating discharge planning if the patient needs post-hospital services based on physician/advanced practitioner order or complex needs related to functional status, cognitive ability, or social support system  Outcome: Progressing     Problem: Knowledge Deficit  Goal: Patient/family/caregiver demonstrates understanding of disease process, treatment plan, medications, and discharge instructions  Description  Complete learning assessment and assess knowledge base    Interventions:  - Provide teaching at level of understanding  - Provide teaching via preferred learning methods  Outcome: Progressing

## 2019-03-09 NOTE — PROGRESS NOTES
Progress Note -Surgery SHUBHAM Shi 35 y o  male MRN: [id number]  Unit/Bed#: -01 Encounter: [id number]    ASSESSMENT/PLAN:  Problem List     * (Principal) Diverticulitis of large intestine with perforation without bleeding  Hypokalemiz   36 yo M with acute diverticulitis with perforation  WBC is trending down and pain is improving  · Continue ice chips  · PO pain meds   · IV abx   · OOB and ambulate  · Replete K     VTE Pharmacologic Prophylaxis: Sequential compression device (Venodyne)  and Enoxaparin (Lovenox)    Subjective/Objective     Subjective:   Overall, the pain has improved  He no longer has diffuse abdominal pain,  It has localized to LLQ  Pain is constant in LLQ but has intermittent sharp LLQ pain 9/10    Objective/Physical Exam: Blood pressure 147/82, pulse 94, temperature 98 1 °F (36 7 °C), temperature source Oral, resp  rate 20, height 6' 2" (1 88 m), weight 115 kg (253 lb 4 9 oz), SpO2 95 %  ,Body mass index is 32 52 kg/m²      General appearance: alert and oriented, in no acute distress  Heart: regular rate and rhythm, S1, S2 normal, no murmur, click, rub or gallop  Lungs: clear to auscultation bilaterally  Abdomen: rounded and soft, +BS, tender LLQ, no guarding or rebound  Neurological: normal without focal findings      Current Facility-Administered Medications:     acetaminophen (TYLENOL) tablet 650 mg, 650 mg, Oral, Q6H PRN, Trudy Ortiz PA-C    ampicillin-sulbactam (UNASYN) 3 g in sodium chloride 0 9 % 100 mL IVPB, 3 g, Intravenous, Q6H, Maximus Mclaughlin PA-C, Last Rate: 200 mL/hr at 03/09/19 0231, 3 g at 03/09/19 0231    enoxaparin (LOVENOX) subcutaneous injection 40 mg, 40 mg, Subcutaneous, Daily, Trudy Ortiz PA-C, 40 mg at 03/08/19 1613    HYDROmorphone (DILAUDID) injection 0 5 mg, 0 5 mg, Intravenous, Q3H PRN, Shirley Mayers PA-C    HYDROmorphone (DILAUDID) injection 1 mg, 1 mg, Intravenous, Q3H PRN, Shirley Mayers PA-C, 1 mg at 03/09/19 6719   lactated ringers infusion, 125 mL/hr, Intravenous, Continuous, Lamberto Smiley PA-C, Last Rate: 125 mL/hr at 03/09/19 0158, 125 mL/hr at 03/09/19 0158    nicotine (NICODERM CQ) 14 mg/24hr TD 24 hr patch 1 patch, 1 patch, Transdermal, Daily, Trudy Ortiz PA-C, 1 patch at 03/08/19 1613    ondansetron (ZOFRAN) injection 4 mg, 4 mg, Intravenous, Q6H PRN, Lamberto Smiley PA-C      Lab, Imaging and other studies:  Admission on 03/08/2019   Component Date Value Ref Range Status    Sodium 03/08/2019 138  136 - 145 mmol/L Final    Potassium 03/08/2019 3 9  3 5 - 5 3 mmol/L Final    Chloride 03/08/2019 102  100 - 108 mmol/L Final    CO2 03/08/2019 29  21 - 32 mmol/L Final    ANION GAP 03/08/2019 7  4 - 13 mmol/L Final    BUN 03/08/2019 15  5 - 25 mg/dL Final    Creatinine 03/08/2019 0 82  0 60 - 1 30 mg/dL Final    Glucose 03/08/2019 105  65 - 140 mg/dL Final    Calcium 03/08/2019 8 7  8 3 - 10 1 mg/dL Final    AST 03/08/2019 13  5 - 45 U/L Final    ALT 03/08/2019 27  12 - 78 U/L Final    Alkaline Phosphatase 03/08/2019 54  46 - 116 U/L Final    Total Protein 03/08/2019 7 0  6 4 - 8 2 g/dL Final    Albumin 03/08/2019 3 1* 3 5 - 5 0 g/dL Final    Total Bilirubin 03/08/2019 0 50  0 20 - 1 00 mg/dL Final    eGFR 03/08/2019 111  ml/min/1 73sq m Final    WBC 03/08/2019 14 05* 4 31 - 10 16 Thousand/uL Final    RBC 03/08/2019 4 67  3 88 - 5 62 Million/uL Final    Hemoglobin 03/08/2019 14 3  12 0 - 17 0 g/dL Final    Hematocrit 03/08/2019 43 3  36 5 - 49 3 % Final    MCV 03/08/2019 93  82 - 98 fL Final    MCH 03/08/2019 30 6  26 8 - 34 3 pg Final    MCHC 03/08/2019 33 0  31 4 - 37 4 g/dL Final    RDW 03/08/2019 14 2  11 6 - 15 1 % Final    MPV 03/08/2019 9 6  8 9 - 12 7 fL Final    Platelets 48/38/4267 297  149 - 390 Thousands/uL Final    nRBC 03/08/2019 0  /100 WBCs Final    Neutrophils Relative 03/08/2019 81* 43 - 75 % Final    Immat GRANS % 03/08/2019 0  0 - 2 % Final    Lymphocytes Relative 03/08/2019 9* 14 - 44 % Final    Monocytes Relative 03/08/2019 8  4 - 12 % Final    Eosinophils Relative 03/08/2019 2  0 - 6 % Final    Basophils Relative 03/08/2019 0  0 - 1 % Final    Neutrophils Absolute 03/08/2019 11 40* 1 85 - 7 62 Thousands/µL Final    Immature Grans Absolute 03/08/2019 0 06  0 00 - 0 20 Thousand/uL Final    Lymphocytes Absolute 03/08/2019 1 22  0 60 - 4 47 Thousands/µL Final    Monocytes Absolute 03/08/2019 1 05  0 17 - 1 22 Thousand/µL Final    Eosinophils Absolute 03/08/2019 0 28  0 00 - 0 61 Thousand/µL Final    Basophils Absolute 03/08/2019 0 04  0 00 - 0 10 Thousands/µL Final    Lipase 03/08/2019 73  73 - 393 u/L Final    Color, UA 03/08/2019 Yellow   Final    Clarity, UA 03/08/2019 Clear   Final    Specific Gravity, UA 03/08/2019 1 010  1 003 - 1 030 Final    pH, UA 03/08/2019 6 5  4 5, 5 0, 5 5, 6 0, 6 5, 7 0, 7 5, 8 0 Final    Leukocytes, UA 03/08/2019 Negative  Negative Final    Nitrite, UA 03/08/2019 Negative  Negative Final    Protein, UA 03/08/2019 Negative  Negative mg/dl Final    Glucose, UA 03/08/2019 Negative  Negative mg/dl Final    Ketones, UA 03/08/2019 Negative  Negative mg/dl Final    Urobilinogen, UA 03/08/2019 0 2  0 2, 1 0 E U /dl E U /dl Final    Bilirubin, UA 03/08/2019 Negative  Negative Final    Blood, UA 03/08/2019 Trace-Intact* Negative Final    RBC, UA 03/08/2019 None Seen  None Seen, 0-5 /hpf Final    WBC, UA 03/08/2019 0-1* None Seen, 0-5, 5-55, 5-65 /hpf Final    Epithelial Cells 03/08/2019 Occasional  None Seen, Occasional /hpf Final    Bacteria, UA 03/08/2019 None Seen  None Seen, Occasional /hpf Final    WBC 03/09/2019 11 68* 4 31 - 10 16 Thousand/uL Final    RBC 03/09/2019 4 44  3 88 - 5 62 Million/uL Final    Hemoglobin 03/09/2019 13 5  12 0 - 17 0 g/dL Final    Hematocrit 03/09/2019 40 3  36 5 - 49 3 % Final    MCV 03/09/2019 91  82 - 98 fL Final    MCH 03/09/2019 30 4  26 8 - 34 3 pg Final    MCHC 03/09/2019 33 5  31 4 - 37 4 g/dL Final    RDW 03/09/2019 14 2  11 6 - 15 1 % Final    Platelets 23/53/0696 280  149 - 390 Thousands/uL Final    MPV 03/09/2019 9 8  8 9 - 12 7 fL Final    Sodium 03/09/2019 138  136 - 145 mmol/L Final    Potassium 03/09/2019 3 4* 3 5 - 5 3 mmol/L Final    Chloride 03/09/2019 102  100 - 108 mmol/L Final    CO2 03/09/2019 26  21 - 32 mmol/L Final    ANION GAP 03/09/2019 10  4 - 13 mmol/L Final    BUN 03/09/2019 9  5 - 25 mg/dL Final    Creatinine 03/09/2019 0 79  0 60 - 1 30 mg/dL Final    Glucose 03/09/2019 108  65 - 140 mg/dL Final    Calcium 03/09/2019 8 5  8 3 - 10 1 mg/dL Final    eGFR 03/09/2019 112  ml/min/1 73sq m Final

## 2019-03-10 LAB
ERYTHROCYTE [DISTWIDTH] IN BLOOD BY AUTOMATED COUNT: 13.8 % (ref 11.6–15.1)
HCT VFR BLD AUTO: 40.6 % (ref 36.5–49.3)
HGB BLD-MCNC: 13.6 G/DL (ref 12–17)
MCH RBC QN AUTO: 30.6 PG (ref 26.8–34.3)
MCHC RBC AUTO-ENTMCNC: 33.5 G/DL (ref 31.4–37.4)
MCV RBC AUTO: 91 FL (ref 82–98)
PLATELET # BLD AUTO: 243 THOUSANDS/UL (ref 149–390)
PMV BLD AUTO: 9.8 FL (ref 8.9–12.7)
RBC # BLD AUTO: 4.44 MILLION/UL (ref 3.88–5.62)
WBC # BLD AUTO: 13.47 THOUSAND/UL (ref 4.31–10.16)

## 2019-03-10 PROCEDURE — C9113 INJ PANTOPRAZOLE SODIUM, VIA: HCPCS | Performed by: SURGERY

## 2019-03-10 PROCEDURE — 85027 COMPLETE CBC AUTOMATED: CPT | Performed by: SURGERY

## 2019-03-10 RX ORDER — PANTOPRAZOLE SODIUM 40 MG/1
40 INJECTION, POWDER, FOR SOLUTION INTRAVENOUS
Status: DISCONTINUED | OUTPATIENT
Start: 2019-03-10 | End: 2019-03-13 | Stop reason: HOSPADM

## 2019-03-10 RX ADMIN — NICOTINE 1 PATCH: 14 PATCH TRANSDERMAL at 08:00

## 2019-03-10 RX ADMIN — SODIUM CHLORIDE 3 G: 9 INJECTION, SOLUTION INTRAVENOUS at 08:01

## 2019-03-10 RX ADMIN — HYDROMORPHONE HYDROCHLORIDE 1 MG: 1 INJECTION, SOLUTION INTRAMUSCULAR; INTRAVENOUS; SUBCUTANEOUS at 01:04

## 2019-03-10 RX ADMIN — ENOXAPARIN SODIUM 40 MG: 40 INJECTION SUBCUTANEOUS at 08:00

## 2019-03-10 RX ADMIN — SODIUM CHLORIDE 3 G: 9 INJECTION, SOLUTION INTRAVENOUS at 13:43

## 2019-03-10 RX ADMIN — HYDROMORPHONE HYDROCHLORIDE 1 MG: 1 INJECTION, SOLUTION INTRAMUSCULAR; INTRAVENOUS; SUBCUTANEOUS at 05:06

## 2019-03-10 RX ADMIN — SODIUM CHLORIDE 3 G: 9 INJECTION, SOLUTION INTRAVENOUS at 19:49

## 2019-03-10 RX ADMIN — HYDROMORPHONE HYDROCHLORIDE 1 MG: 1 INJECTION, SOLUTION INTRAMUSCULAR; INTRAVENOUS; SUBCUTANEOUS at 09:28

## 2019-03-10 RX ADMIN — PANTOPRAZOLE SODIUM 40 MG: 40 INJECTION, POWDER, FOR SOLUTION INTRAVENOUS at 10:44

## 2019-03-10 RX ADMIN — HYDROMORPHONE HYDROCHLORIDE 1 MG: 1 INJECTION, SOLUTION INTRAMUSCULAR; INTRAVENOUS; SUBCUTANEOUS at 20:43

## 2019-03-10 RX ADMIN — SODIUM CHLORIDE 3 G: 9 INJECTION, SOLUTION INTRAVENOUS at 03:01

## 2019-03-10 RX ADMIN — HYDROMORPHONE HYDROCHLORIDE 1 MG: 1 INJECTION, SOLUTION INTRAMUSCULAR; INTRAVENOUS; SUBCUTANEOUS at 15:59

## 2019-03-10 RX ADMIN — HYDROMORPHONE HYDROCHLORIDE 1 MG: 1 INJECTION, SOLUTION INTRAMUSCULAR; INTRAVENOUS; SUBCUTANEOUS at 12:27

## 2019-03-10 RX ADMIN — SODIUM CHLORIDE, SODIUM LACTATE, POTASSIUM CHLORIDE, AND CALCIUM CHLORIDE 100 ML/HR: .6; .31; .03; .02 INJECTION, SOLUTION INTRAVENOUS at 12:27

## 2019-03-10 NOTE — PLAN OF CARE
Problem: Potential for Falls  Goal: Patient will remain free of falls  Description  INTERVENTIONS:  - Assess patient frequently for physical needs  -  Identify cognitive and physical deficits and behaviors that affect risk of falls    -  [place] fall precautions as indicated by assessment   - Educate patient/family on patient safety including physical limitations  - Instruct patient to call for assistance with activity based on assessment  - Modify environment to reduce risk of injury  - Consider OT/PT consult to assist with strengthening/mobility  Outcome: Progressing     Problem: Prexisting or High Potential for Compromised Skin Integrity  Goal: Skin integrity is maintained or improved  Description  INTERVENTIONS:  - Identify patients at risk for skin breakdown  - Assess and monitor skin integrity  - Assess and monitor nutrition and hydration status  - Monitor labs (i e  albumin)  - Assist with mobility/ambulation  - Avoid friction and shearing  - Provide appropriate hygiene as needed including keeping skin clean and dry  - Evaluate need for skin moisturizer/barrier cream  - Collaborate with interdisciplinary team (i e  Nutrition, Rehabilitation, etc )   - Patient/family teaching  Outcome: Progressing     Problem: PAIN - ADULT  Goal: Verbalizes/displays adequate comfort level or baseline comfort level  Description  Interventions:  - Encourage patient to monitor pain and request assistance  - Assess pain using appropriate pain scale  - Administer analgesics based on type and severity of pain and evaluate response  - Implement non-pharmacological measures as appropriate and evaluate response  - Consider cultural and social influences on pain and pain management  - Notify physician/advanced practitioner if interventions unsuccessful or patient reports new pain  Outcome: Progressing     Problem: INFECTION - ADULT  Goal: Absence or prevention of progression during hospitalization  Description  INTERVENTIONS:  - Assess and monitor for signs and symptoms of infection  - Monitor lab/diagnostic results  - Monitor all insertion sites, i e  indwelling lines, tubes, and drains  - Monitor endotracheal (as able) and nasal secretions for changes in amount and color  - [place] appropriate cooling/warming therapies per order  - Administer medications as ordered  - Instruct and encourage patient and family to use good hand hygiene technique  - Identify and instruct in appropriate isolation precautions for identified infection/condition  Outcome: Progressing  Goal: Absence of fever/infection during neutropenic period  Description  INTERVENTIONS:  - Monitor WBC  - Implement neutropenic guidelines  Outcome: Progressing     Problem: SAFETY ADULT  Goal: Maintain or return to baseline ADL function  Description  INTERVENTIONS:  -  Assess patient's ability to carry out ADLs; assess patient's baseline for ADL function and identify physical deficits which impact ability to perform ADLs (bathing, care of mouth/teeth, toileting, grooming, dressing, etc )  - Assess/evaluate cause of self-care deficits   - Assess range of motion  - Assess patient's mobility; develop plan if impaired  - Assess patient's need for assistive devices and provide as appropriate  - Encourage maximum independence but intervene and supervise when necessary  ¯ Involve family in performance of ADLs  ¯ Assess for home care needs following discharge   ¯ Request OT consult to assist with ADL evaluation and planning for discharge  ¯ Provide patient education as appropriate  Outcome: Progressing  Goal: Maintain or return mobility status to optimal level  Description  INTERVENTIONS:  - Assess patient's baseline mobility status (ambulation, transfers, stairs, etc )    - Identify cognitive and physical deficits and behaviors that affect mobility  - Identify mobility aids required to assist with transfers and/or ambulation (gait belt, sit-to-stand, lift, walker, cane, etc )  - [place] fall precautions as indicated by assessment  - Record patient progress and toleration of activity level on Mobility SBAR; progress patient to next Phase/Stage  - Instruct patient to call for assistance with activity based on assessment  - Request Rehabilitation consult to assist with strengthening/weightbearing, etc   Outcome: Progressing     Problem: DISCHARGE PLANNING  Goal: Discharge to home or other facility with appropriate resources  Description  INTERVENTIONS:  - Identify barriers to discharge w/patient and caregiver  - Arrange for needed discharge resources and transportation as appropriate  - Identify discharge learning needs (meds, wound care, etc )  - Arrange for interpretive services to assist at discharge as needed  - Refer to Case Management Department for coordinating discharge planning if the patient needs post-hospital services based on physician/advanced practitioner order or complex needs related to functional status, cognitive ability, or social support system  Outcome: Progressing     Problem: Knowledge Deficit  Goal: Patient/family/caregiver demonstrates understanding of disease process, treatment plan, medications, and discharge instructions  Description  Complete learning assessment and assess knowledge base    Interventions:  - Provide teaching at level of understanding  - Provide teaching via preferred learning methods  Outcome: Progressing

## 2019-03-10 NOTE — PLAN OF CARE
Problem: Potential for Falls  Goal: Patient will remain free of falls  Description  INTERVENTIONS:  - Assess patient frequently for physical needs  -  Identify cognitive and physical deficits and behaviors that affect risk of falls    -  [place] fall precautions as indicated by assessment   - Educate patient/family on patient safety including physical limitations  - Instruct patient to call for assistance with activity based on assessment  - Modify environment to reduce risk of injury  - Consider OT/PT consult to assist with strengthening/mobility  Outcome: Progressing     Problem: Prexisting or High Potential for Compromised Skin Integrity  Goal: Skin integrity is maintained or improved  Description  INTERVENTIONS:  - Identify patients at risk for skin breakdown  - Assess and monitor skin integrity  - Assess and monitor nutrition and hydration status  - Monitor labs (i e  albumin)  - Assess for incontinence   - Turn and reposition patient  - Assist with mobility/ambulation  - Relieve pressure over bony prominences  - Avoid friction and shearing  - Provide appropriate hygiene as needed including keeping skin clean and dry  - Evaluate need for skin moisturizer/barrier cream  - Collaborate with interdisciplinary team (i e  Nutrition, Rehabilitation, etc )   - Patient/family teaching  Outcome: Progressing     Problem: PAIN - ADULT  Goal: Verbalizes/displays adequate comfort level or baseline comfort level  Description  Interventions:  - Encourage patient to monitor pain and request assistance  - Assess pain using appropriate pain scale  - Administer analgesics based on type and severity of pain and evaluate response  - Implement non-pharmacological measures as appropriate and evaluate response  - Consider cultural and social influences on pain and pain management  - Notify physician/advanced practitioner if interventions unsuccessful or patient reports new pain  Outcome: Progressing     Problem: INFECTION - ADULT  Goal: Absence or prevention of progression during hospitalization  Description  INTERVENTIONS:  - Assess and monitor for signs and symptoms of infection  - Monitor lab/diagnostic results  - Monitor all insertion sites, i e  indwelling lines, tubes, and drains  - Monitor endotracheal (as able) and nasal secretions for changes in amount and color  - [place] appropriate cooling/warming therapies per order  - Administer medications as ordered  - Instruct and encourage patient and family to use good hand hygiene technique  - Identify and instruct in appropriate isolation precautions for identified infection/condition  Outcome: Progressing  Goal: Absence of fever/infection during neutropenic period  Description  INTERVENTIONS:  - Monitor WBC  - Implement neutropenic guidelines  Outcome: Progressing     Problem: SAFETY ADULT  Goal: Maintain or return to baseline ADL function  Description  INTERVENTIONS:  -  Assess patient's ability to carry out ADLs; assess patient's baseline for ADL function and identify physical deficits which impact ability to perform ADLs (bathing, care of mouth/teeth, toileting, grooming, dressing, etc )  - Assess/evaluate cause of self-care deficits   - Assess range of motion  - Assess patient's mobility; develop plan if impaired  - Assess patient's need for assistive devices and provide as appropriate  - Encourage maximum independence but intervene and supervise when necessary  ¯ Involve family in performance of ADLs  ¯ Assess for home care needs following discharge   ¯ Request OT consult to assist with ADL evaluation and planning for discharge  ¯ Provide patient education as appropriate  Outcome: Progressing  Goal: Maintain or return mobility status to optimal level  Description  INTERVENTIONS:  - Assess patient's baseline mobility status (ambulation, transfers, stairs, etc )    - Identify cognitive and physical deficits and behaviors that affect mobility  - Identify mobility aids required to assist with transfers and/or ambulation (gait belt, sit-to-stand, lift, walker, cane, etc )  - [place] fall precautions as indicated by assessment  - Record patient progress and toleration of activity level on Mobility SBAR; progress patient to next Phase/Stage  - Instruct patient to call for assistance with activity based on assessment  - Request Rehabilitation consult to assist with strengthening/weightbearing, etc   Outcome: Progressing     Problem: DISCHARGE PLANNING  Goal: Discharge to home or other facility with appropriate resources  Description  INTERVENTIONS:  - Identify barriers to discharge w/patient and caregiver  - Arrange for needed discharge resources and transportation as appropriate  - Identify discharge learning needs (meds, wound care, etc )  - Arrange for interpretive services to assist at discharge as needed  - Refer to Case Management Department for coordinating discharge planning if the patient needs post-hospital services based on physician/advanced practitioner order or complex needs related to functional status, cognitive ability, or social support system  Outcome: Progressing     Problem: Knowledge Deficit  Goal: Patient/family/caregiver demonstrates understanding of disease process, treatment plan, medications, and discharge instructions  Description  Complete learning assessment and assess knowledge base    Interventions:  - Provide teaching at level of understanding  - Provide teaching via preferred learning methods  Outcome: Progressing

## 2019-03-10 NOTE — PROGRESS NOTES
Progress Note -Surgery PA  Jenn Guadalupe 35 y o  male MRN: [id number]  Unit/Bed#: -01 Encounter: [id number]    ASSESSMENT/PLAN:  Problem List     * (Principal) Diverticulitis of large intestine with perforation without bleeding   39 with acute diverticulitis with perforation  Improving clinical exam   Continues with leukocytosis  · Continue clears  · Pain meds prn  · OOB  · Trend WBC  · Serial exam     VTE Pharmacologic Prophylaxis: Sequential compression device (Venodyne)  and Enoxaparin (Lovenox)    Subjective/Objective     Subjective:   Pain localized to LLQ  + intermittent sharp pain    Objective/Physical Exam: Blood pressure 140/83, pulse 82, temperature 98 4 °F (36 9 °C), temperature source Oral, resp  rate 18, height 6' 2" (1 88 m), weight 115 kg (253 lb 4 9 oz), SpO2 96 %  ,Body mass index is 32 52 kg/m²  General appearance: alert and oriented, in no acute distress  Heart: regular rate and rhythm, S1, S2 normal, no murmur, click, rub or gallop  Lungs: clear to auscultation bilaterally  Abdomen: tender LLQ , no rebound or guarding       Current Facility-Administered Medications:     acetaminophen (TYLENOL) tablet 650 mg, 650 mg, Oral, Q6H PRN, Samuel Oliver DO    ampicillin-sulbactam (UNASYN) 3 g in sodium chloride 0 9 % 100 mL IVPB, 3 g, Intravenous, Q6H, Samuel Oliver DO, Last Rate: 200 mL/hr at 03/10/19 0801, 3 g at 03/10/19 0801    enoxaparin (LOVENOX) subcutaneous injection 40 mg, 40 mg, Subcutaneous, Daily, Samuel Oliver DO, 40 mg at 03/10/19 0800    HYDROmorphone (DILAUDID) injection 0 5 mg, 0 5 mg, Intravenous, Q3H PRN, Samuel Oliver DO    HYDROmorphone (DILAUDID) injection 1 mg, 1 mg, Intravenous, Q3H PRN, Samuel Oliver DO, 1 mg at 03/10/19 0506    lactated ringers infusion, 125 mL/hr, Intravenous, Continuous, Samuel Oliver DO, Last Rate: 125 mL/hr at 03/09/19 1753, 125 mL/hr at 03/09/19 1753    nicotine (NICODERM CQ) 14 mg/24hr TD 24 hr patch 1 patch, 1 patch, Transdermal, Daily, Samuel Oliver DO, 1 patch at 03/10/19 0800    ondansetron (ZOFRAN) injection 4 mg, 4 mg, Intravenous, Q6H PRN, Dolores Rios DO      Intake/Output Summary (Last 24 hours) at 3/10/2019 0911  Last data filed at 3/10/2019 0801  Gross per 24 hour   Intake 4225 83 ml   Output 2300 ml   Net 1925 83 ml       Lab, Imaging and other studies:  Admission on 03/08/2019   Component Date Value Ref Range Status    Sodium 03/08/2019 138  136 - 145 mmol/L Final    Potassium 03/08/2019 3 9  3 5 - 5 3 mmol/L Final    Chloride 03/08/2019 102  100 - 108 mmol/L Final    CO2 03/08/2019 29  21 - 32 mmol/L Final    ANION GAP 03/08/2019 7  4 - 13 mmol/L Final    BUN 03/08/2019 15  5 - 25 mg/dL Final    Creatinine 03/08/2019 0 82  0 60 - 1 30 mg/dL Final    Glucose 03/08/2019 105  65 - 140 mg/dL Final    Calcium 03/08/2019 8 7  8 3 - 10 1 mg/dL Final    AST 03/08/2019 13  5 - 45 U/L Final    ALT 03/08/2019 27  12 - 78 U/L Final    Alkaline Phosphatase 03/08/2019 54  46 - 116 U/L Final    Total Protein 03/08/2019 7 0  6 4 - 8 2 g/dL Final    Albumin 03/08/2019 3 1* 3 5 - 5 0 g/dL Final    Total Bilirubin 03/08/2019 0 50  0 20 - 1 00 mg/dL Final    eGFR 03/08/2019 111  ml/min/1 73sq m Final    WBC 03/08/2019 14 05* 4 31 - 10 16 Thousand/uL Final    RBC 03/08/2019 4 67  3 88 - 5 62 Million/uL Final    Hemoglobin 03/08/2019 14 3  12 0 - 17 0 g/dL Final    Hematocrit 03/08/2019 43 3  36 5 - 49 3 % Final    MCV 03/08/2019 93  82 - 98 fL Final    MCH 03/08/2019 30 6  26 8 - 34 3 pg Final    MCHC 03/08/2019 33 0  31 4 - 37 4 g/dL Final    RDW 03/08/2019 14 2  11 6 - 15 1 % Final    MPV 03/08/2019 9 6  8 9 - 12 7 fL Final    Platelets 25/40/5777 297  149 - 390 Thousands/uL Final    nRBC 03/08/2019 0  /100 WBCs Final    Neutrophils Relative 03/08/2019 81* 43 - 75 % Final    Immat GRANS % 03/08/2019 0  0 - 2 % Final    Lymphocytes Relative 03/08/2019 9* 14 - 44 % Final    Monocytes Relative 03/08/2019 8  4 - 12 % Final    Eosinophils Relative 03/08/2019 2  0 - 6 % Final    Basophils Relative 03/08/2019 0  0 - 1 % Final    Neutrophils Absolute 03/08/2019 11 40* 1 85 - 7 62 Thousands/µL Final    Immature Grans Absolute 03/08/2019 0 06  0 00 - 0 20 Thousand/uL Final    Lymphocytes Absolute 03/08/2019 1 22  0 60 - 4 47 Thousands/µL Final    Monocytes Absolute 03/08/2019 1 05  0 17 - 1 22 Thousand/µL Final    Eosinophils Absolute 03/08/2019 0 28  0 00 - 0 61 Thousand/µL Final    Basophils Absolute 03/08/2019 0 04  0 00 - 0 10 Thousands/µL Final    Lipase 03/08/2019 73  73 - 393 u/L Final    Color, UA 03/08/2019 Yellow   Final    Clarity, UA 03/08/2019 Clear   Final    Specific Gravity, UA 03/08/2019 1 010  1 003 - 1 030 Final    pH, UA 03/08/2019 6 5  4 5, 5 0, 5 5, 6 0, 6 5, 7 0, 7 5, 8 0 Final    Leukocytes, UA 03/08/2019 Negative  Negative Final    Nitrite, UA 03/08/2019 Negative  Negative Final    Protein, UA 03/08/2019 Negative  Negative mg/dl Final    Glucose, UA 03/08/2019 Negative  Negative mg/dl Final    Ketones, UA 03/08/2019 Negative  Negative mg/dl Final    Urobilinogen, UA 03/08/2019 0 2  0 2, 1 0 E U /dl E U /dl Final    Bilirubin, UA 03/08/2019 Negative  Negative Final    Blood, UA 03/08/2019 Trace-Intact* Negative Final    RBC, UA 03/08/2019 None Seen  None Seen, 0-5 /hpf Final    WBC, UA 03/08/2019 0-1* None Seen, 0-5, 5-55, 5-65 /hpf Final    Epithelial Cells 03/08/2019 Occasional  None Seen, Occasional /hpf Final    Bacteria, UA 03/08/2019 None Seen  None Seen, Occasional /hpf Final    WBC 03/09/2019 11 68* 4 31 - 10 16 Thousand/uL Final    RBC 03/09/2019 4 44  3 88 - 5 62 Million/uL Final    Hemoglobin 03/09/2019 13 5  12 0 - 17 0 g/dL Final    Hematocrit 03/09/2019 40 3  36 5 - 49 3 % Final    MCV 03/09/2019 91  82 - 98 fL Final    MCH 03/09/2019 30 4  26 8 - 34 3 pg Final    MCHC 03/09/2019 33 5  31 4 - 37 4 g/dL Final    RDW 03/09/2019 14 2  11 6 - 15 1 % Final    Platelets 12/63/9164 280  149 - 390 Thousands/uL Final    MPV 03/09/2019 9 8  8 9 - 12 7 fL Final    Sodium 03/09/2019 138  136 - 145 mmol/L Final    Potassium 03/09/2019 3 4* 3 5 - 5 3 mmol/L Final    Chloride 03/09/2019 102  100 - 108 mmol/L Final    CO2 03/09/2019 26  21 - 32 mmol/L Final    ANION GAP 03/09/2019 10  4 - 13 mmol/L Final    BUN 03/09/2019 9  5 - 25 mg/dL Final    Creatinine 03/09/2019 0 79  0 60 - 1 30 mg/dL Final    Glucose 03/09/2019 108  65 - 140 mg/dL Final    Calcium 03/09/2019 8 5  8 3 - 10 1 mg/dL Final    eGFR 03/09/2019 112  ml/min/1 73sq m Final    WBC 03/10/2019 13 47* 4 31 - 10 16 Thousand/uL Final    RBC 03/10/2019 4 44  3 88 - 5 62 Million/uL Final    Hemoglobin 03/10/2019 13 6  12 0 - 17 0 g/dL Final    Hematocrit 03/10/2019 40 6  36 5 - 49 3 % Final    MCV 03/10/2019 91  82 - 98 fL Final    MCH 03/10/2019 30 6  26 8 - 34 3 pg Final    MCHC 03/10/2019 33 5  31 4 - 37 4 g/dL Final    RDW 03/10/2019 13 8  11 6 - 15 1 % Final    Platelets 92/67/4185 243  149 - 390 Thousands/uL Final    MPV 03/10/2019 9 8  8 9 - 12 7 fL Final

## 2019-03-11 LAB
ANION GAP SERPL CALCULATED.3IONS-SCNC: 9 MMOL/L (ref 4–13)
BASOPHILS # BLD AUTO: 0.03 THOUSANDS/ΜL (ref 0–0.1)
BASOPHILS NFR BLD AUTO: 0 % (ref 0–1)
BUN SERPL-MCNC: 7 MG/DL (ref 5–25)
CALCIUM SERPL-MCNC: 8.5 MG/DL (ref 8.3–10.1)
CHLORIDE SERPL-SCNC: 104 MMOL/L (ref 100–108)
CO2 SERPL-SCNC: 27 MMOL/L (ref 21–32)
CREAT SERPL-MCNC: 0.73 MG/DL (ref 0.6–1.3)
EOSINOPHIL # BLD AUTO: 0.61 THOUSAND/ΜL (ref 0–0.61)
EOSINOPHIL NFR BLD AUTO: 6 % (ref 0–6)
ERYTHROCYTE [DISTWIDTH] IN BLOOD BY AUTOMATED COUNT: 13.9 % (ref 11.6–15.1)
GFR SERPL CREATININE-BSD FRML MDRD: 116 ML/MIN/1.73SQ M
GLUCOSE SERPL-MCNC: 94 MG/DL (ref 65–140)
HCT VFR BLD AUTO: 40.6 % (ref 36.5–49.3)
HGB BLD-MCNC: 13.4 G/DL (ref 12–17)
IMM GRANULOCYTES # BLD AUTO: 0.04 THOUSAND/UL (ref 0–0.2)
IMM GRANULOCYTES NFR BLD AUTO: 0 % (ref 0–2)
LYMPHOCYTES # BLD AUTO: 1.87 THOUSANDS/ΜL (ref 0.6–4.47)
LYMPHOCYTES NFR BLD AUTO: 20 % (ref 14–44)
MCH RBC QN AUTO: 30.5 PG (ref 26.8–34.3)
MCHC RBC AUTO-ENTMCNC: 33 G/DL (ref 31.4–37.4)
MCV RBC AUTO: 92 FL (ref 82–98)
MONOCYTES # BLD AUTO: 0.98 THOUSAND/ΜL (ref 0.17–1.22)
MONOCYTES NFR BLD AUTO: 10 % (ref 4–12)
NEUTROPHILS # BLD AUTO: 6.02 THOUSANDS/ΜL (ref 1.85–7.62)
NEUTS SEG NFR BLD AUTO: 64 % (ref 43–75)
NRBC BLD AUTO-RTO: 0 /100 WBCS
PLATELET # BLD AUTO: 262 THOUSANDS/UL (ref 149–390)
PMV BLD AUTO: 10 FL (ref 8.9–12.7)
POTASSIUM SERPL-SCNC: 3.9 MMOL/L (ref 3.5–5.3)
RBC # BLD AUTO: 4.4 MILLION/UL (ref 3.88–5.62)
SODIUM SERPL-SCNC: 140 MMOL/L (ref 136–145)
WBC # BLD AUTO: 9.55 THOUSAND/UL (ref 4.31–10.16)

## 2019-03-11 PROCEDURE — C9113 INJ PANTOPRAZOLE SODIUM, VIA: HCPCS | Performed by: SURGERY

## 2019-03-11 PROCEDURE — 80048 BASIC METABOLIC PNL TOTAL CA: CPT | Performed by: SURGERY

## 2019-03-11 PROCEDURE — 85025 COMPLETE CBC W/AUTO DIFF WBC: CPT | Performed by: SURGERY

## 2019-03-11 RX ADMIN — HYDROMORPHONE HYDROCHLORIDE 1 MG: 1 INJECTION, SOLUTION INTRAMUSCULAR; INTRAVENOUS; SUBCUTANEOUS at 08:30

## 2019-03-11 RX ADMIN — PANTOPRAZOLE SODIUM 40 MG: 40 INJECTION, POWDER, FOR SOLUTION INTRAVENOUS at 08:29

## 2019-03-11 RX ADMIN — ENOXAPARIN SODIUM 40 MG: 40 INJECTION SUBCUTANEOUS at 08:29

## 2019-03-11 RX ADMIN — SODIUM CHLORIDE, SODIUM LACTATE, POTASSIUM CHLORIDE, AND CALCIUM CHLORIDE 100 ML/HR: .6; .31; .03; .02 INJECTION, SOLUTION INTRAVENOUS at 00:31

## 2019-03-11 RX ADMIN — SODIUM CHLORIDE 3 G: 9 INJECTION, SOLUTION INTRAVENOUS at 20:00

## 2019-03-11 RX ADMIN — SODIUM CHLORIDE, SODIUM LACTATE, POTASSIUM CHLORIDE, AND CALCIUM CHLORIDE 100 ML/HR: .6; .31; .03; .02 INJECTION, SOLUTION INTRAVENOUS at 11:10

## 2019-03-11 RX ADMIN — HYDROMORPHONE HYDROCHLORIDE 0.5 MG: 1 INJECTION, SOLUTION INTRAMUSCULAR; INTRAVENOUS; SUBCUTANEOUS at 20:20

## 2019-03-11 RX ADMIN — SODIUM CHLORIDE 3 G: 9 INJECTION, SOLUTION INTRAVENOUS at 02:08

## 2019-03-11 RX ADMIN — SODIUM CHLORIDE 3 G: 9 INJECTION, SOLUTION INTRAVENOUS at 13:07

## 2019-03-11 RX ADMIN — HYDROMORPHONE HYDROCHLORIDE 1 MG: 1 INJECTION, SOLUTION INTRAMUSCULAR; INTRAVENOUS; SUBCUTANEOUS at 04:10

## 2019-03-11 RX ADMIN — HYDROMORPHONE HYDROCHLORIDE 0.5 MG: 1 INJECTION, SOLUTION INTRAMUSCULAR; INTRAVENOUS; SUBCUTANEOUS at 13:10

## 2019-03-11 RX ADMIN — SODIUM CHLORIDE, SODIUM LACTATE, POTASSIUM CHLORIDE, AND CALCIUM CHLORIDE 100 ML/HR: .6; .31; .03; .02 INJECTION, SOLUTION INTRAVENOUS at 22:13

## 2019-03-11 RX ADMIN — HYDROMORPHONE HYDROCHLORIDE 0.5 MG: 1 INJECTION, SOLUTION INTRAMUSCULAR; INTRAVENOUS; SUBCUTANEOUS at 16:55

## 2019-03-11 RX ADMIN — HYDROMORPHONE HYDROCHLORIDE 1 MG: 1 INJECTION, SOLUTION INTRAMUSCULAR; INTRAVENOUS; SUBCUTANEOUS at 00:32

## 2019-03-11 RX ADMIN — SODIUM CHLORIDE 3 G: 9 INJECTION, SOLUTION INTRAVENOUS at 07:35

## 2019-03-11 NOTE — DISCHARGE INSTRUCTIONS
Diverticulitis   Manju Collins FF : Diverticular Disease (Diverticulosis, Diverticulitis)  In: [employer] 2017, [facility], [place], [facility] 2017  Wander MARQUEZD & Nury KR : Diverticulosis and diverticulitis  Day Clin Proc 2016; 91(8):6473-1363  Automatic Data of Diabetes and Digestive and Kidney Diseases (NIDDK): Definition & facts for diverticular disease  Automatic Data of Diabetes and Digestive and Kidney Diseases (NIDDK)  MD Grupo  2016  Available from URL: [employer] uy  As accessed 2017-03-24  Automatic Data of Diabetes and Digestive and Kidney Diseases (NIDDK): Diagnosis of diverticular disease  Automatic Data of Diabetes and Digestive and Kidney Diseases (NIDDK)  MD Grupo  2016  Available from URL: [email or web address]  As accessed 2017-03-24  Automatic Data of Diabetes and Digestive and Kidney Diseases (NIDDK): Eating, diet, & nutrition for diverticular disease  Automatic Data of Diabetes and Digestive and Kidney Diseases (NIDDK)  MD Grupo  2016  Available from URL: [employer] at  As accessed 2017-03-24  Automatic Data of Diabetes and Digestive and Kidney Diseases (NIDDK): Symptoms & causes of diverticular disease  Automatic Data of Diabetes and Digestive and Kidney Diseases (NIDDK)  MD Grupo  2016  Available from URL: [email or web address]  As accessed 2017-03-24  Automatic Data of Diabetes and Digestive and Kidney Diseases (NIDDK): Treatment for diverticular disease  Automatic Data of Diabetes and Digestive and Kidney Diseases (NIDDK)  MD Grupo  2016   Available from URL: [email or web address]"/  As accessed 2017-03-24  Luanashahida IVONNE & Iraida TM : Acute Diverticulitis  In: Rodriguez MB, John BA, Kishor KD, et al, eds  Practical Gastroenterology and Hepatology Board Review Toolkit, [address] [facility], 9618  Micki Reveal : Diverticular Disease  In: Roseland Stephon, ed  [facility] Clinical Practice of Emergency Medicine, 6th ed  [address] [facility],[address] [place] [address], 8445  Humes DJ & Randy RC : Diverticular Disease of the Colon  In: Jennifer Johnson et al, eds  Stef Nick of Gastroenterology, [address] [facility], 2087  Guy Juarez T : Diverticular Disease  In: Aldo Spoon, Treinta Y Daryn 7066, [address] [place]  Gastrointestinal Emergencies, 3rd ed  [address] [facility], 6860  Sachar DB : Diverticular Diseases of the Colon  In: Polcarlene BE, ed  [facility] Expert Guides: Gastroenterology, [address] [facility], 2014  Angélica Saucedo I : [address] [facility] Guideline on the Management of Acute Diverticulitis  Gastroenterology 2015; 149(7):6088-6073  Hanny RE: Diverticulitis  In: Fredi Severe, Barkin RM, [employer], et al, eds  [address] Emergency Medicine Consult, 5th ed  [address] [facility],[address] [place] [address], 1954  Arabella SM: Gastrointestinal Disorders  In: Eliza Manual of Nursing Practice, 10th ed  [address] [facility],[address] [place] [address], 2013, [place] [phone]  Hanny RE: Diverticulitis  In: Fredi Severe, Barkin RM, [employer], et al  Trace Miles  [address] Emergency Medicine Consult, 4th ed  [address] [facility],[address] [place] [address], 2011    © 2017 [address] [facility] Information is for End User's use only and may not be sold, redistributed or otherwise used for commercial purposes  All illustrations and images included in CareNotes® are the copyrighted property of A D A M , Inc  or Sanjay Lovell  The above information is an  only  It is not intended as medical advice for individual conditions or treatments  Talk to your doctor, nurse or pharmacist before following any medical regimen to see if it is safe and effective for you

## 2019-03-11 NOTE — PLAN OF CARE
Problem: Potential for Falls  Goal: Patient will remain free of falls  Description  INTERVENTIONS:  - Assess patient frequently for physical needs  -  Identify cognitive and physical deficits and behaviors that affect risk of falls    -  [place] fall precautions as indicated by assessment   - Educate patient/family on patient safety including physical limitations  - Instruct patient to call for assistance with activity based on assessment  - Modify environment to reduce risk of injury  - Consider OT/PT consult to assist with strengthening/mobility  Outcome: Progressing     Problem: Prexisting or High Potential for Compromised Skin Integrity  Goal: Skin integrity is maintained or improved  Description  INTERVENTIONS:  - Identify patients at risk for skin breakdown  - Assess and monitor skin integrity  - Assess and monitor nutrition and hydration status  - Monitor labs (i e  albumin)  - Assess for incontinence   - Turn and reposition patient  - Assist with mobility/ambulation  - Relieve pressure over bony prominences  - Avoid friction and shearing  - Provide appropriate hygiene as needed including keeping skin clean and dry  - Evaluate need for skin moisturizer/barrier cream  - Collaborate with interdisciplinary team (i e  Nutrition, Rehabilitation, etc )   - Patient/family teaching  Outcome: Progressing     Problem: PAIN - ADULT  Goal: Verbalizes/displays adequate comfort level or baseline comfort level  Description  Interventions:  - Encourage patient to monitor pain and request assistance  - Assess pain using appropriate pain scale  - Administer analgesics based on type and severity of pain and evaluate response  - Implement non-pharmacological measures as appropriate and evaluate response  - Consider cultural and social influences on pain and pain management  - Notify physician/advanced practitioner if interventions unsuccessful or patient reports new pain  Outcome: Progressing     Problem: INFECTION - ADULT  Goal: Absence or prevention of progression during hospitalization  Description  INTERVENTIONS:  - Assess and monitor for signs and symptoms of infection  - Monitor lab/diagnostic results  - Monitor all insertion sites, i e  indwelling lines, tubes, and drains  - Monitor endotracheal (as able) and nasal secretions for changes in amount and color  - [place] appropriate cooling/warming therapies per order  - Administer medications as ordered  - Instruct and encourage patient and family to use good hand hygiene technique  - Identify and instruct in appropriate isolation precautions for identified infection/condition  Outcome: Progressing  Goal: Absence of fever/infection during neutropenic period  Description  INTERVENTIONS:  - Monitor WBC  - Implement neutropenic guidelines  Outcome: Progressing     Problem: SAFETY ADULT  Goal: Maintain or return to baseline ADL function  Description  INTERVENTIONS:  -  Assess patient's ability to carry out ADLs; assess patient's baseline for ADL function and identify physical deficits which impact ability to perform ADLs (bathing, care of mouth/teeth, toileting, grooming, dressing, etc )  - Assess/evaluate cause of self-care deficits   - Assess range of motion  - Assess patient's mobility; develop plan if impaired  - Assess patient's need for assistive devices and provide as appropriate  - Encourage maximum independence but intervene and supervise when necessary  ¯ Involve family in performance of ADLs  ¯ Assess for home care needs following discharge   ¯ Request OT consult to assist with ADL evaluation and planning for discharge  ¯ Provide patient education as appropriate  Outcome: Progressing  Goal: Maintain or return mobility status to optimal level  Description  INTERVENTIONS:  - Assess patient's baseline mobility status (ambulation, transfers, stairs, etc )    - Identify cognitive and physical deficits and behaviors that affect mobility  - Identify mobility aids required to assist with transfers and/or ambulation (gait belt, sit-to-stand, lift, walker, cane, etc )  - [place] fall precautions as indicated by assessment  - Record patient progress and toleration of activity level on Mobility SBAR; progress patient to next Phase/Stage  - Instruct patient to call for assistance with activity based on assessment  - Request Rehabilitation consult to assist with strengthening/weightbearing, etc   Outcome: Progressing     Problem: DISCHARGE PLANNING  Goal: Discharge to home or other facility with appropriate resources  Description  INTERVENTIONS:  - Identify barriers to discharge w/patient and caregiver  - Arrange for needed discharge resources and transportation as appropriate  - Identify discharge learning needs (meds, wound care, etc )  - Arrange for interpretive services to assist at discharge as needed  - Refer to Case Management Department for coordinating discharge planning if the patient needs post-hospital services based on physician/advanced practitioner order or complex needs related to functional status, cognitive ability, or social support system  Outcome: Progressing     Problem: Knowledge Deficit  Goal: Patient/family/caregiver demonstrates understanding of disease process, treatment plan, medications, and discharge instructions  Description  Complete learning assessment and assess knowledge base    Interventions:  - Provide teaching at level of understanding  - Provide teaching via preferred learning methods  Outcome: Progressing

## 2019-03-11 NOTE — PROGRESS NOTES
Progress Note -Surgery PA  Emelia Jain 35 y o  male MRN: [id number]  Unit/Bed#: -01 Encounter: [id number]    ASSESSMENT/PLAN:  Problem List     * (Principal) Diverticulitis of large intestine with perforation without bleeding   34 yo M with acute tic with microperforation  Clinical exam continues to improve and today WBC is normal     · Continue IV Unasyn  Will need abx for discharge home  · Fulls/T/C diet today  · OOB and ambulate  · Pain control     VTE Pharmacologic Prophylaxis: Sequential compression device (Venodyne)  and Enoxaparin (Lovenox)    Subjective/Objective     Subjective:   Still has intermittent LLQ pain but improved from yesterday  Objective/Physical Exam: Blood pressure 126/70, pulse 96, temperature 99 4 °F (37 4 °C), temperature source Oral, resp  rate 18, height 6' 2" (1 88 m), weight 115 kg (253 lb 4 9 oz), SpO2 94 %  ,Body mass index is 32 52 kg/m²  General appearance: alert and oriented, in no acute distress  Heart: regular rate and rhythm, S1, S2 normal, no murmur, click, rub or gallop  Lungs: clear to auscultation bilaterally  Abdomen: rounded and soft, +BS, tender LLQ only, no rebound or guarding    Neurological: normal without focal findings      Current Facility-Administered Medications:     acetaminophen (TYLENOL) tablet 650 mg, 650 mg, Oral, Q6H PRN, Samuel Oliver DO    ampicillin-sulbactam (UNASYN) 3 g in sodium chloride 0 9 % 100 mL IVPB, 3 g, Intravenous, Q6H, Samuel Oliver DO, Last Rate: 200 mL/hr at 03/11/19 0735, 3 g at 03/11/19 0735    enoxaparin (LOVENOX) subcutaneous injection 40 mg, 40 mg, Subcutaneous, Daily, Samuel Oliver DO, 40 mg at 03/10/19 0800    HYDROmorphone (DILAUDID) injection 0 5 mg, 0 5 mg, Intravenous, Q3H PRN, Samuel Oliver DO    HYDROmorphone (DILAUDID) injection 1 mg, 1 mg, Intravenous, Q3H PRN, Samuel Oliver DO, 1 mg at 03/11/19 0410    lactated ringers infusion, 100 mL/hr, Intravenous, Continuous, Samuel Oliver DO, Last Rate: 100 mL/hr at 03/11/19 0031, 100 mL/hr at 03/11/19 0031    nicotine (NICODERM CQ) 14 mg/24hr TD 24 hr patch 1 patch, 1 patch, Transdermal, Daily, Samuel Oliver DO, 1 patch at 03/10/19 0800    ondansetron (ZOFRAN) injection 4 mg, 4 mg, Intravenous, Q6H PRN, Samuel Oliver DO    pantoprazole (PROTONIX) injection 40 mg, 40 mg, Intravenous, Q24H KARLENE, Samuel Oliver DO, 40 mg at 03/10/19 1044    Lab, Imaging and other studies:   WBC 03/11/2019 9 55  4 31 - 10 16 Thousand/uL Final    RBC 03/11/2019 4 40  3 88 - 5 62 Million/uL Final    Hemoglobin 03/11/2019 13 4  12 0 - 17 0 g/dL Final    Hematocrit 03/11/2019 40 6  36 5 - 49 3 % Final    MCV 03/11/2019 92  82 - 98 fL Final    MCH 03/11/2019 30 5  26 8 - 34 3 pg Final    MCHC 03/11/2019 33 0  31 4 - 37 4 g/dL Final    RDW 03/11/2019 13 9  11 6 - 15 1 % Final    MPV 03/11/2019 10 0  8 9 - 12 7 fL Final    Platelets 58/43/1704 262  149 - 390 Thousands/uL Final    nRBC 03/11/2019 0  /100 WBCs Final    Neutrophils Relative 03/11/2019 64  43 - 75 % Final    Immat GRANS % 03/11/2019 0  0 - 2 % Final    Lymphocytes Relative 03/11/2019 20  14 - 44 % Final    Monocytes Relative 03/11/2019 10  4 - 12 % Final    Eosinophils Relative 03/11/2019 6  0 - 6 % Final    Basophils Relative 03/11/2019 0  0 - 1 % Final    Neutrophils Absolute 03/11/2019 6 02  1 85 - 7 62 Thousands/µL Final    Immature Grans Absolute 03/11/2019 0 04  0 00 - 0 20 Thousand/uL Final    Lymphocytes Absolute 03/11/2019 1 87  0 60 - 4 47 Thousands/µL Final    Monocytes Absolute 03/11/2019 0 98  0 17 - 1 22 Thousand/µL Final    Eosinophils Absolute 03/11/2019 0 61  0 00 - 0 61 Thousand/µL Final    Basophils Absolute 03/11/2019 0 03  0 00 - 0 10 Thousands/µL Final    Sodium 03/11/2019 140  136 - 145 mmol/L Final    Potassium 03/11/2019 3 9  3 5 - 5 3 mmol/L Final    Chloride 03/11/2019 104  100 - 108 mmol/L Final    CO2 03/11/2019 27  21 - 32 mmol/L Final    ANION GAP 03/11/2019 9  4 - 13 mmol/L Final    BUN 03/11/2019 7  5 - 25 mg/dL Final    Creatinine 03/11/2019 0 73  0 60 - 1 30 mg/dL Final    Glucose 03/11/2019 94  65 - 140 mg/dL Final    Calcium 03/11/2019 8 5  8 3 - 10 1 mg/dL Final    eGFR 03/11/2019 116  ml/min/1 73sq m Final

## 2019-03-12 PROCEDURE — C9113 INJ PANTOPRAZOLE SODIUM, VIA: HCPCS | Performed by: SURGERY

## 2019-03-12 RX ADMIN — HYDROMORPHONE HYDROCHLORIDE 0.5 MG: 1 INJECTION, SOLUTION INTRAMUSCULAR; INTRAVENOUS; SUBCUTANEOUS at 12:00

## 2019-03-12 RX ADMIN — SODIUM CHLORIDE, SODIUM LACTATE, POTASSIUM CHLORIDE, AND CALCIUM CHLORIDE 100 ML/HR: .6; .31; .03; .02 INJECTION, SOLUTION INTRAVENOUS at 18:59

## 2019-03-12 RX ADMIN — SODIUM CHLORIDE 3 G: 9 INJECTION, SOLUTION INTRAVENOUS at 02:04

## 2019-03-12 RX ADMIN — SODIUM CHLORIDE, SODIUM LACTATE, POTASSIUM CHLORIDE, AND CALCIUM CHLORIDE 100 ML/HR: .6; .31; .03; .02 INJECTION, SOLUTION INTRAVENOUS at 08:16

## 2019-03-12 RX ADMIN — HYDROMORPHONE HYDROCHLORIDE 0.5 MG: 1 INJECTION, SOLUTION INTRAMUSCULAR; INTRAVENOUS; SUBCUTANEOUS at 07:21

## 2019-03-12 RX ADMIN — HYDROMORPHONE HYDROCHLORIDE 0.5 MG: 1 INJECTION, SOLUTION INTRAMUSCULAR; INTRAVENOUS; SUBCUTANEOUS at 01:24

## 2019-03-12 RX ADMIN — SODIUM CHLORIDE 3 G: 9 INJECTION, SOLUTION INTRAVENOUS at 20:30

## 2019-03-12 RX ADMIN — HYDROMORPHONE HYDROCHLORIDE 0.5 MG: 1 INJECTION, SOLUTION INTRAMUSCULAR; INTRAVENOUS; SUBCUTANEOUS at 16:07

## 2019-03-12 RX ADMIN — SODIUM CHLORIDE 3 G: 9 INJECTION, SOLUTION INTRAVENOUS at 13:20

## 2019-03-12 RX ADMIN — SODIUM CHLORIDE 3 G: 9 INJECTION, SOLUTION INTRAVENOUS at 07:16

## 2019-03-12 RX ADMIN — PANTOPRAZOLE SODIUM 40 MG: 40 INJECTION, POWDER, FOR SOLUTION INTRAVENOUS at 08:14

## 2019-03-12 RX ADMIN — HYDROMORPHONE HYDROCHLORIDE 0.5 MG: 1 INJECTION, SOLUTION INTRAMUSCULAR; INTRAVENOUS; SUBCUTANEOUS at 20:38

## 2019-03-12 RX ADMIN — ENOXAPARIN SODIUM 40 MG: 40 INJECTION SUBCUTANEOUS at 08:15

## 2019-03-12 NOTE — PLAN OF CARE
Problem: Potential for Falls  Goal: Patient will remain free of falls  Description  INTERVENTIONS:  - Assess patient frequently for physical needs  -  Identify cognitive and physical deficits and behaviors that affect risk of falls    -  [place] fall precautions as indicated by assessment   - Educate patient/family on patient safety including physical limitations  - Instruct patient to call for assistance with activity based on assessment  - Modify environment to reduce risk of injury  - Consider OT/PT consult to assist with strengthening/mobility  Outcome: Progressing     Problem: Prexisting or High Potential for Compromised Skin Integrity  Goal: Skin integrity is maintained or improved  Description  INTERVENTIONS:  - Identify patients at risk for skin breakdown  - Assess and monitor skin integrity  - Assess and monitor nutrition and hydration status  - Monitor labs (i e  albumin)  - Assess for incontinence   - Turn and reposition patient  - Assist with mobility/ambulation  - Relieve pressure over bony prominences  - Avoid friction and shearing  - Provide appropriate hygiene as needed including keeping skin clean and dry  - Evaluate need for skin moisturizer/barrier cream  - Collaborate with interdisciplinary team (i e  Nutrition, Rehabilitation, etc )   - Patient/family teaching  Outcome: Progressing     Problem: PAIN - ADULT  Goal: Verbalizes/displays adequate comfort level or baseline comfort level  Description  Interventions:  - Encourage patient to monitor pain and request assistance  - Assess pain using appropriate pain scale  - Administer analgesics based on type and severity of pain and evaluate response  - Implement non-pharmacological measures as appropriate and evaluate response  - Consider cultural and social influences on pain and pain management  - Notify physician/advanced practitioner if interventions unsuccessful or patient reports new pain  Outcome: Progressing     Problem: INFECTION - ADULT  Goal: Absence or prevention of progression during hospitalization  Description  INTERVENTIONS:  - Assess and monitor for signs and symptoms of infection  - Monitor lab/diagnostic results  - Monitor all insertion sites, i e  indwelling lines, tubes, and drains  - Monitor endotracheal (as able) and nasal secretions for changes in amount and color  - [place] appropriate cooling/warming therapies per order  - Administer medications as ordered  - Instruct and encourage patient and family to use good hand hygiene technique  - Identify and instruct in appropriate isolation precautions for identified infection/condition  Outcome: Progressing  Goal: Absence of fever/infection during neutropenic period  Description  INTERVENTIONS:  - Monitor WBC  - Implement neutropenic guidelines  Outcome: Progressing     Problem: SAFETY ADULT  Goal: Maintain or return to baseline ADL function  Description  INTERVENTIONS:  -  Assess patient's ability to carry out ADLs; assess patient's baseline for ADL function and identify physical deficits which impact ability to perform ADLs (bathing, care of mouth/teeth, toileting, grooming, dressing, etc )  - Assess/evaluate cause of self-care deficits   - Assess range of motion  - Assess patient's mobility; develop plan if impaired  - Assess patient's need for assistive devices and provide as appropriate  - Encourage maximum independence but intervene and supervise when necessary  ¯ Involve family in performance of ADLs  ¯ Assess for home care needs following discharge   ¯ Request OT consult to assist with ADL evaluation and planning for discharge  ¯ Provide patient education as appropriate  Outcome: Progressing  Goal: Maintain or return mobility status to optimal level  Description  INTERVENTIONS:  - Assess patient's baseline mobility status (ambulation, transfers, stairs, etc )    - Identify cognitive and physical deficits and behaviors that affect mobility  - Identify mobility aids required to assist with transfers and/or ambulation (gait belt, sit-to-stand, lift, walker, cane, etc )  - [place] fall precautions as indicated by assessment  - Record patient progress and toleration of activity level on Mobility SBAR; progress patient to next Phase/Stage  - Instruct patient to call for assistance with activity based on assessment  - Request Rehabilitation consult to assist with strengthening/weightbearing, etc   Outcome: Progressing     Problem: DISCHARGE PLANNING  Goal: Discharge to home or other facility with appropriate resources  Description  INTERVENTIONS:  - Identify barriers to discharge w/patient and caregiver  - Arrange for needed discharge resources and transportation as appropriate  - Identify discharge learning needs (meds, wound care, etc )  - Arrange for interpretive services to assist at discharge as needed  - Refer to Case Management Department for coordinating discharge planning if the patient needs post-hospital services based on physician/advanced practitioner order or complex needs related to functional status, cognitive ability, or social support system  Outcome: Progressing     Problem: Knowledge Deficit  Goal: Patient/family/caregiver demonstrates understanding of disease process, treatment plan, medications, and discharge instructions  Description  Complete learning assessment and assess knowledge base    Interventions:  - Provide teaching at level of understanding  - Provide teaching via preferred learning methods  Outcome: Progressing

## 2019-03-12 NOTE — PLAN OF CARE
Problem: Potential for Falls  Goal: Patient will remain free of falls  Description  INTERVENTIONS:  - Assess patient frequently for physical needs  -  Identify cognitive and physical deficits and behaviors that affect risk of falls    -  [place] fall precautions as indicated by assessment   - Educate patient/family on patient safety including physical limitations  - Instruct patient to call for assistance with activity based on assessment  - Modify environment to reduce risk of injury  - Consider OT/PT consult to assist with strengthening/mobility  Outcome: Progressing     Problem: Prexisting or High Potential for Compromised Skin Integrity  Goal: Skin integrity is maintained or improved  Description  INTERVENTIONS:  - Identify patients at risk for skin breakdown  - Assess and monitor skin integrity  - Assess and monitor nutrition and hydration status  - Monitor labs (i e  albumin)  - Assess for incontinence   - Turn and reposition patient  - Assist with mobility/ambulation  - Relieve pressure over bony prominences  - Avoid friction and shearing  - Provide appropriate hygiene as needed including keeping skin clean and dry  - Evaluate need for skin moisturizer/barrier cream  - Collaborate with interdisciplinary team (i e  Nutrition, Rehabilitation, etc )   - Patient/family teaching  Outcome: Progressing     Problem: PAIN - ADULT  Goal: Verbalizes/displays adequate comfort level or baseline comfort level  Description  Interventions:  - Encourage patient to monitor pain and request assistance  - Assess pain using appropriate pain scale  - Administer analgesics based on type and severity of pain and evaluate response  - Implement non-pharmacological measures as appropriate and evaluate response  - Consider cultural and social influences on pain and pain management  - Notify physician/advanced practitioner if interventions unsuccessful or patient reports new pain  Outcome: Progressing     Problem: INFECTION - ADULT  Goal: Absence or prevention of progression during hospitalization  Description  INTERVENTIONS:  - Assess and monitor for signs and symptoms of infection  - Monitor lab/diagnostic results  - Monitor all insertion sites, i e  indwelling lines, tubes, and drains  - Monitor endotracheal (as able) and nasal secretions for changes in amount and color  - [place] appropriate cooling/warming therapies per order  - Administer medications as ordered  - Instruct and encourage patient and family to use good hand hygiene technique  - Identify and instruct in appropriate isolation precautions for identified infection/condition  Outcome: Progressing  Goal: Absence of fever/infection during neutropenic period  Description  INTERVENTIONS:  - Monitor WBC  - Implement neutropenic guidelines  Outcome: Progressing     Problem: SAFETY ADULT  Goal: Maintain or return to baseline ADL function  Description  INTERVENTIONS:  -  Assess patient's ability to carry out ADLs; assess patient's baseline for ADL function and identify physical deficits which impact ability to perform ADLs (bathing, care of mouth/teeth, toileting, grooming, dressing, etc )  - Assess/evaluate cause of self-care deficits   - Assess range of motion  - Assess patient's mobility; develop plan if impaired  - Assess patient's need for assistive devices and provide as appropriate  - Encourage maximum independence but intervene and supervise when necessary  ¯ Involve family in performance of ADLs  ¯ Assess for home care needs following discharge   ¯ Request OT consult to assist with ADL evaluation and planning for discharge  ¯ Provide patient education as appropriate  Outcome: Progressing  Goal: Maintain or return mobility status to optimal level  Description  INTERVENTIONS:  - Assess patient's baseline mobility status (ambulation, transfers, stairs, etc )    - Identify cognitive and physical deficits and behaviors that affect mobility  - Identify mobility aids required to assist with transfers and/or ambulation (gait belt, sit-to-stand, lift, walker, cane, etc )  - [place] fall precautions as indicated by assessment  - Record patient progress and toleration of activity level on Mobility SBAR; progress patient to next Phase/Stage  - Instruct patient to call for assistance with activity based on assessment  - Request Rehabilitation consult to assist with strengthening/weightbearing, etc   Outcome: Progressing     Problem: DISCHARGE PLANNING  Goal: Discharge to home or other facility with appropriate resources  Description  INTERVENTIONS:  - Identify barriers to discharge w/patient and caregiver  - Arrange for needed discharge resources and transportation as appropriate  - Identify discharge learning needs (meds, wound care, etc )  - Arrange for interpretive services to assist at discharge as needed  - Refer to Case Management Department for coordinating discharge planning if the patient needs post-hospital services based on physician/advanced practitioner order or complex needs related to functional status, cognitive ability, or social support system  Outcome: Progressing     Problem: Knowledge Deficit  Goal: Patient/family/caregiver demonstrates understanding of disease process, treatment plan, medications, and discharge instructions  Description  Complete learning assessment and assess knowledge base    Interventions:  - Provide teaching at level of understanding  - Provide teaching via preferred learning methods  Outcome: Progressing

## 2019-03-12 NOTE — PROGRESS NOTES
Progress Note -Surgery PA  Tarun Ashby 35 y o  male MRN: [id number]  Unit/Bed#: -01 Encounter: [id number]    ASSESSMENT/PLAN:  Problem List     * (Principal) Diverticulitis of large intestine with perforation without bleeding   36 yo M with improving diverticulitis with perforation  Tolerating diet and pain improved  LLQ pain frequency is decreasing and pain is less severe, 6/10  +bowel function  · IV abx, will transition to PO on DC  · OOB and ambulate  · Advance to surgical soft  · Heplock   · DC home today or tomorrow anticipated     VTE Pharmacologic Prophylaxis: Sequential compression device (Venodyne)  and Enoxaparin (Lovenox)    Subjective/Objective     Subjective: feeling better  Denies N/V  Tolerating clears/T/C  +BM x3     Objective/Physical Exam: Blood pressure 142/98, pulse 80, temperature 98 °F (36 7 °C), temperature source Oral, resp  rate 18, height 6' 2" (1 88 m), weight 115 kg (253 lb 4 9 oz), SpO2 94 %  ,Body mass index is 32 52 kg/m²  General appearance: alert and oriented, in no acute distress  Heart: regular rate and rhythm, S1, S2 normal, no murmur, click, rub or gallop  Lungs: clear to auscultation bilaterally  Abdomen: rounded and soft, +BS, tender LLQ, no rebound or guarding      Current Facility-Administered Medications:     acetaminophen (TYLENOL) tablet 650 mg, 650 mg, Oral, Q6H PRN, Samuel Oliver DO    ampicillin-sulbactam (UNASYN) 3 g in sodium chloride 0 9 % 100 mL IVPB, 3 g, Intravenous, Q6H, Samuel Oliver DO, Last Rate: 200 mL/hr at 03/12/19 0716, 3 g at 03/12/19 0716    enoxaparin (LOVENOX) subcutaneous injection 40 mg, 40 mg, Subcutaneous, Daily, Samuel Oliver DO, 40 mg at 03/12/19 0815    HYDROmorphone (DILAUDID) injection 0 5 mg, 0 5 mg, Intravenous, Q3H PRN, Samuel Oliver DO, 0 5 mg at 03/12/19 0721    HYDROmorphone (DILAUDID) injection 1 mg, 1 mg, Intravenous, Q3H PRN, Samuel Oliver DO, 1 mg at 03/11/19 0830    lactated ringers infusion, 100 mL/hr, Intravenous, Continuous, Samuel Oliver DO, Last Rate: 100 mL/hr at 03/12/19 0816, 100 mL/hr at 03/12/19 0816    nicotine (NICODERM CQ) 14 mg/24hr TD 24 hr patch 1 patch, 1 patch, Transdermal, Daily, Samuel Oliver DO, 1 patch at 03/10/19 0800    ondansetron (ZOFRAN) injection 4 mg, 4 mg, Intravenous, Q6H PRN, Samuel Oliver DO    pantoprazole (PROTONIX) injection 40 mg, 40 mg, Intravenous, Q24H [facility], Samuel Oliver DO, 40 mg at 03/12/19 9382

## 2019-03-13 VITALS
OXYGEN SATURATION: 96 % | WEIGHT: 253.31 LBS | SYSTOLIC BLOOD PRESSURE: 140 MMHG | TEMPERATURE: 97.5 F | HEIGHT: 74 IN | BODY MASS INDEX: 32.51 KG/M2 | HEART RATE: 75 BPM | DIASTOLIC BLOOD PRESSURE: 92 MMHG | RESPIRATION RATE: 18 BRPM

## 2019-03-13 PROCEDURE — C9113 INJ PANTOPRAZOLE SODIUM, VIA: HCPCS | Performed by: SURGERY

## 2019-03-13 RX ORDER — OXYCODONE HYDROCHLORIDE AND ACETAMINOPHEN 5; 325 MG/1; MG/1
1 TABLET ORAL EVERY 4 HOURS PRN
Qty: 25 TABLET | Refills: 0 | Status: SHIPPED | OUTPATIENT
Start: 2019-03-13 | End: 2019-03-23

## 2019-03-13 RX ORDER — METRONIDAZOLE 250 MG/1
250 TABLET ORAL EVERY 8 HOURS SCHEDULED
Qty: 21 TABLET | Refills: 0 | Status: SHIPPED | OUTPATIENT
Start: 2019-03-13 | End: 2019-03-20

## 2019-03-13 RX ORDER — AMOXICILLIN AND CLAVULANATE POTASSIUM 875; 125 MG/1; MG/1
1 TABLET, FILM COATED ORAL EVERY 12 HOURS SCHEDULED
Qty: 14 TABLET | Refills: 0 | Status: SHIPPED | OUTPATIENT
Start: 2019-03-13 | End: 2019-03-20

## 2019-03-13 RX ADMIN — HYDROMORPHONE HYDROCHLORIDE 0.5 MG: 1 INJECTION, SOLUTION INTRAMUSCULAR; INTRAVENOUS; SUBCUTANEOUS at 05:18

## 2019-03-13 RX ADMIN — SODIUM CHLORIDE 3 G: 9 INJECTION, SOLUTION INTRAVENOUS at 07:37

## 2019-03-13 RX ADMIN — PANTOPRAZOLE SODIUM 40 MG: 40 INJECTION, POWDER, FOR SOLUTION INTRAVENOUS at 08:53

## 2019-03-13 RX ADMIN — SODIUM CHLORIDE 3 G: 9 INJECTION, SOLUTION INTRAVENOUS at 01:54

## 2019-03-13 RX ADMIN — ENOXAPARIN SODIUM 40 MG: 40 INJECTION SUBCUTANEOUS at 08:53

## 2019-03-13 RX ADMIN — HYDROMORPHONE HYDROCHLORIDE 0.5 MG: 1 INJECTION, SOLUTION INTRAMUSCULAR; INTRAVENOUS; SUBCUTANEOUS at 01:56

## 2019-03-13 RX ADMIN — SODIUM CHLORIDE, SODIUM LACTATE, POTASSIUM CHLORIDE, AND CALCIUM CHLORIDE 100 ML/HR: .6; .31; .03; .02 INJECTION, SOLUTION INTRAVENOUS at 04:29

## 2019-03-13 RX ADMIN — HYDROMORPHONE HYDROCHLORIDE 0.5 MG: 1 INJECTION, SOLUTION INTRAMUSCULAR; INTRAVENOUS; SUBCUTANEOUS at 10:45

## 2019-03-13 NOTE — DISCHARGE SUMMARY
Discharge Summary - Kevin Coffman 35 y o  male MRN: [id number]    Unit/Bed#: -01 Encounter: [id number]    Admission Date: 3/8/2019   Discharge Date: 03/13/19    Admitting Diagnosis:   Diverticulitis [K57 92]  Abdominal pain [R10 9]  Perforated sigmoid colon ([address] ) [K63 1]    Discharge Diagnoses: Principal Problem:    Diverticulitis of large intestine with perforation without bleeding    Consultations: none    Procedures Performed: none    Hospital Course: Kevin Coffman is a 35 y o  healthy male admitted for nausea, diarrhea and vomiting associated with abdominal pain  He was found to have acute diverticulitis and was started on IV antibiotics and made NPO  Over the course of the hospitalization his pain slowly improved and his diet was slowly advanced  It was discussed that he will need a colonoscopy in 4 to 6 weeks  His leukocytosis resolved and he was cleared for discharge home on a low residue diet    Condition at Discharge: good     Discharge instructions/Information to patient and family:   See after visit summary for information provided to patient and family  Provisions for Follow-Up Care:  See after visit summary for information related to follow-up care and any pertinent home health orders  Disposition: Home    Planned Readmission: No    Discharge Statement   I spent 20 minutes discharging the patient  This time was spent on the day of discharge  I had direct contact with the patient on the day of discharge  Additional documentation is required if more than 30 minutes were spent on discharge  Discharge Medications:  See after visit summary for reconciled discharge medications provided to patient and family

## 2019-03-13 NOTE — PROGRESS NOTES
Progress Note -Surgery PA  Gold Rajput 35 y o  male MRN: [id number]  Unit/Bed#: -01 Encounter: [id number]    ASSESSMENT/PLAN:  Problem List     * (Principal) Diverticulitis of large intestine with perforation without bleeding   34 yo M with acute tic with microperforation  Tolerating surgical soft diet  · Continue IV Unasyn  Will need abx for discharge home  · Surgical soft diet for discharge  · OOB and ambulate  · PO pain control  · DC home today, RTW next wednesday     VTE Pharmacologic Prophylaxis: Sequential compression device (Venodyne)  and Enoxaparin (Lovenox)    Subjective/Objective     Subjective:  Feel better today  Tolerating diet   Denies N/V    Objective/Physical Exam: Blood pressure 138/90, pulse 86, temperature 97 9 °F (36 6 °C), temperature source Oral, resp  rate 18, height 6' 2" (1 88 m), weight 115 kg (253 lb 4 9 oz), SpO2 97 %  ,Body mass index is 32 52 kg/m²  General appearance: alert and oriented, in no acute distress  Heart: regular rate and rhythm, S1, S2 normal, no murmur, click, rub or gallop  Lungs: clear to auscultation bilaterally  Abdomen: rounded and soft, +BS, minimal tenderness BLQ, no rebound or guarding    Neurological: normal without focal findings      Current Facility-Administered Medications:     acetaminophen (TYLENOL) tablet 650 mg, 650 mg, Oral, Q6H PRN, Samuel Oliver DO    ampicillin-sulbactam (UNASYN) 3 g in sodium chloride 0 9 % 100 mL IVPB, 3 g, Intravenous, Q6H, Samuel Oliver DO, Last Rate: 200 mL/hr at 03/13/19 0154, 3 g at 03/13/19 0154    enoxaparin (LOVENOX) subcutaneous injection 40 mg, 40 mg, Subcutaneous, Daily, Samuel Oliver DO, 40 mg at 03/12/19 0815    HYDROmorphone (DILAUDID) injection 0 5 mg, 0 5 mg, Intravenous, Q3H PRN, Samuel Oliver DO, 0 5 mg at 03/13/19 0518    HYDROmorphone (DILAUDID) injection 1 mg, 1 mg, Intravenous, Q3H PRN, Samuel Oliver DO, 1 mg at 03/11/19 0830    lactated ringers infusion, 100 mL/hr, Intravenous, Continuous, Samuel Oliver DO, Last Rate: 100 mL/hr at 03/13/19 0429, 100 mL/hr at 03/13/19 0429    nicotine (NICODERM CQ) 14 mg/24hr TD 24 hr patch 1 patch, 1 patch, Transdermal, Daily, Samuel Oliver DO, 1 patch at 03/10/19 0800    ondansetron (ZOFRAN) injection 4 mg, 4 mg, Intravenous, Q6H PRN, Samuel Oliver DO    pantoprazole (PROTONIX) injection 40 mg, 40 mg, Intravenous, Q24H [address], Samuel Oliver DO, 40 mg at 03/12/19 8354

## 2019-03-16 NOTE — UTILIZATION REVIEW
Notification of Discharge  This is a Notification of Discharge from our facility [address]  Please be advised that this patient has been discharge from our facility  Below you will find the admission and discharge date and time including the patients disposition  PRESENTATION DATE: 3/8/2019 12:38 PM  IP ADMISSION DATE: 3/8/19 1520  DISCHARGE DATE: 3/13/2019 12:24 PM  DISPOSITION: [facility] Utilization Review Department  Phone: [phone]; Fax [phone]  [email or web address]  ATTENTION: Please call with any questions or concerns to [phone]  and carefully listen to the prompts so that you are directed to the right person  Send all requests for admission clinical reviews, approved or denied determinations and any other requests to fax [phone]   All voicemails are confidential

## 2020-10-18 ENCOUNTER — NURSE TRIAGE (OUTPATIENT)
Dept: OTHER | Facility: OTHER | Age: 40
End: 2020-10-18

## 2020-10-18 DIAGNOSIS — R52 BODY ACHES: Primary | ICD-10-CM

## 2020-10-19 DIAGNOSIS — R52 BODY ACHES: ICD-10-CM

## 2020-10-19 PROCEDURE — U0003 INFECTIOUS AGENT DETECTION BY NUCLEIC ACID (DNA OR RNA); SEVERE ACUTE RESPIRATORY SYNDROME CORONAVIRUS 2 (SARS-COV-2) (CORONAVIRUS DISEASE [COVID-19]), AMPLIFIED PROBE TECHNIQUE, MAKING USE OF HIGH THROUGHPUT TECHNOLOGIES AS DESCRIBED BY CMS-2020-01-R: HCPCS | Performed by: FAMILY MEDICINE

## 2020-10-20 ENCOUNTER — TELEPHONE (OUTPATIENT)
Dept: OTHER | Facility: OTHER | Age: 40
End: 2020-10-20

## 2020-10-20 LAB — SARS-COV-2 RNA SPEC QL NAA+PROBE: DETECTED

## 2020-11-02 LAB — HCV AB SER-ACNC: NEGATIVE

## 2020-11-05 LAB
EXTERNAL CHLAMYDIA RESULT: NEGATIVE
EXTERNAL HIV SCREEN: NORMAL
N GONORRHOEA RRNA SPEC QL PROBE: NEGATIVE

## 2021-06-17 ENCOUNTER — TELEPHONE (OUTPATIENT)
Dept: GASTROENTEROLOGY | Facility: CLINIC | Age: 41
End: 2021-06-17

## 2021-06-17 NOTE — TELEPHONE ENCOUNTER
Recall call went out via [employer] in 3/20 with no return calls from pt to schedule  Pt is overdue for 6 mno f/u appt with Dr Barb Morgan for diverticulitis  I lmom for pt to please call back to schedule an appt with Dr Barb Morgan  Will call pt again in two weeks if do not hear back from him

## 2022-03-24 ENCOUNTER — TELEPHONE (OUTPATIENT)
Dept: GASTROENTEROLOGY | Facility: CLINIC | Age: 42
End: 2022-03-24

## 2022-03-24 NOTE — TELEPHONE ENCOUNTER
Received message from pt asking for a call back and that needs appt with Dr Pedro Luis Calix on a Tue or a Wed  I returned pt's call, lmom apologizing for missing him and asked him to please call back

## 2023-02-14 ENCOUNTER — HOSPITAL ENCOUNTER (EMERGENCY)
Facility: HOSPITAL | Age: 43
Discharge: HOME | End: 2023-02-15
Attending: EMERGENCY MEDICINE

## 2023-02-14 ENCOUNTER — APPOINTMENT (EMERGENCY)
Dept: CT IMAGING | Facility: HOSPITAL | Age: 43
End: 2023-02-14

## 2023-02-14 ENCOUNTER — APPOINTMENT (EMERGENCY)
Dept: RADIOLOGY | Facility: HOSPITAL | Age: 43
End: 2023-02-14

## 2023-02-14 DIAGNOSIS — R10.9 ABDOMINAL PAIN: ICD-10-CM

## 2023-02-14 DIAGNOSIS — K57.90 DIVERTICULOSIS: ICD-10-CM

## 2023-02-14 DIAGNOSIS — N23 RENAL COLIC: Primary | ICD-10-CM

## 2023-02-14 DIAGNOSIS — N13.2 URETERAL STONE WITH HYDRONEPHROSIS: ICD-10-CM

## 2023-02-14 DIAGNOSIS — R11.0 NAUSEA: ICD-10-CM

## 2023-02-14 LAB
ALBUMIN SERPL BCP-MCNC: 4.2 G/DL (ref 3.5–5)
ALP SERPL-CCNC: 57 U/L (ref 34–104)
ALT SERPL W P-5'-P-CCNC: 22 U/L (ref 7–52)
ANION GAP SERPL CALCULATED.3IONS-SCNC: 8 MMOL/L (ref 4–13)
APTT PPP: 28 SECONDS (ref 23–37)
AST SERPL W P-5'-P-CCNC: 16 U/L (ref 13–39)
BASOPHILS # BLD AUTO: 0.06 THOUSANDS/ÂΜL (ref 0–0.1)
BASOPHILS NFR BLD AUTO: 1 % (ref 0–1)
BILIRUB SERPL-MCNC: 0.32 MG/DL (ref 0.2–1)
BUN SERPL-MCNC: 20 MG/DL (ref 5–25)
CALCIUM SERPL-MCNC: 9 MG/DL (ref 8.4–10.2)
CARDIAC TROPONIN I PNL SERPL HS: 5 NG/L
CHLORIDE SERPL-SCNC: 106 MMOL/L (ref 96–108)
CO2 SERPL-SCNC: 25 MMOL/L (ref 21–32)
CREAT SERPL-MCNC: 0.9 MG/DL (ref 0.6–1.3)
EOSINOPHIL # BLD AUTO: 0.52 THOUSAND/ÂΜL (ref 0–0.61)
EOSINOPHIL NFR BLD AUTO: 5 % (ref 0–6)
ERYTHROCYTE [DISTWIDTH] IN BLOOD BY AUTOMATED COUNT: 14.1 % (ref 11.6–15.1)
GFR SERPL CREATININE-BSD FRML MDRD: 104 ML/MIN/1.73SQ M
GLUCOSE SERPL-MCNC: 109 MG/DL (ref 65–140)
HCT VFR BLD AUTO: 44.2 % (ref 36.5–49.3)
HGB BLD-MCNC: 14.4 G/DL (ref 12–17)
IMM GRANULOCYTES # BLD AUTO: 0.04 THOUSAND/UL (ref 0–0.2)
IMM GRANULOCYTES NFR BLD AUTO: 0 % (ref 0–2)
INR PPP: 0.88 (ref 0.84–1.19)
LACTATE SERPL-SCNC: 1 MMOL/L (ref 0.5–2)
LIPASE SERPL-CCNC: 29 U/L (ref 11–82)
LYMPHOCYTES # BLD AUTO: 2.9 THOUSANDS/ÂΜL (ref 0.6–4.47)
LYMPHOCYTES NFR BLD AUTO: 28 % (ref 14–44)
MCH RBC QN AUTO: 29.9 PG (ref 26.8–34.3)
MCHC RBC AUTO-ENTMCNC: 32.6 G/DL (ref 31.4–37.4)
MCV RBC AUTO: 92 FL (ref 82–98)
MONOCYTES # BLD AUTO: 0.77 THOUSAND/ÂΜL (ref 0.17–1.22)
MONOCYTES NFR BLD AUTO: 7 % (ref 4–12)
NEUTROPHILS # BLD AUTO: 6.11 THOUSANDS/ÂΜL (ref 1.85–7.62)
NEUTS SEG NFR BLD AUTO: 59 % (ref 43–75)
NRBC BLD AUTO-RTO: 0 /100 WBCS
PLATELET # BLD AUTO: 324 THOUSANDS/UL (ref 149–390)
PMV BLD AUTO: 10 FL (ref 8.9–12.7)
POTASSIUM SERPL-SCNC: 4.1 MMOL/L (ref 3.5–5.3)
PROT SERPL-MCNC: 7.4 G/DL (ref 6.4–8.4)
PROTHROMBIN TIME: 12.1 SECONDS (ref 11.6–14.5)
RBC # BLD AUTO: 4.81 MILLION/UL (ref 3.88–5.62)
SODIUM SERPL-SCNC: 139 MMOL/L (ref 135–147)
WBC # BLD AUTO: 10.4 THOUSAND/UL (ref 4.31–10.16)

## 2023-02-14 RX ORDER — KETOROLAC TROMETHAMINE 30 MG/ML
30 INJECTION, SOLUTION INTRAMUSCULAR; INTRAVENOUS ONCE
Status: DISCONTINUED | OUTPATIENT
Start: 2023-02-14 | End: 2023-02-14

## 2023-02-14 RX ORDER — KETOROLAC TROMETHAMINE 30 MG/ML
15 INJECTION, SOLUTION INTRAMUSCULAR; INTRAVENOUS ONCE
Status: COMPLETED | OUTPATIENT
Start: 2023-02-15 | End: 2023-02-15

## 2023-02-14 RX ORDER — MORPHINE SULFATE 4 MG/ML
4 INJECTION, SOLUTION INTRAMUSCULAR; INTRAVENOUS ONCE
Status: COMPLETED | OUTPATIENT
Start: 2023-02-14 | End: 2023-02-14

## 2023-02-14 RX ORDER — TAMSULOSIN HYDROCHLORIDE 0.4 MG/1
0.4 CAPSULE ORAL ONCE
Status: COMPLETED | OUTPATIENT
Start: 2023-02-15 | End: 2023-02-15

## 2023-02-14 RX ADMIN — MORPHINE SULFATE 4 MG: 4 INJECTION INTRAVENOUS at 22:34

## 2023-02-14 RX ADMIN — SODIUM CHLORIDE 1000 ML: 0.9 INJECTION, SOLUTION INTRAVENOUS at 22:37

## 2023-02-14 RX ADMIN — IOHEXOL 100 ML: 350 INJECTION, SOLUTION INTRAVENOUS at 23:34

## 2023-02-14 NOTE — Clinical Note
Joey Garcia was seen and treated in our emergency department on 2/14/2023  No restrictions            Diagnosis:     Ola Kocher  may return to work on return date  He may return on this date: 02/22/2023    May return sooner if stone is passed and free of pain  If you have any questions or concerns, please don't hesitate to call        Seth St MD    ______________________________           _______________          _______________  Hospital Representative                              Date                                Time

## 2023-02-15 VITALS
DIASTOLIC BLOOD PRESSURE: 91 MMHG | RESPIRATION RATE: 18 BRPM | TEMPERATURE: 98.4 F | SYSTOLIC BLOOD PRESSURE: 149 MMHG | OXYGEN SATURATION: 94 % | HEART RATE: 82 BPM

## 2023-02-15 LAB
2HR DELTA HS TROPONIN: 0 NG/L
AMORPH URATE CRY URNS QL MICRO: ABNORMAL
BACTERIA UR QL AUTO: ABNORMAL /HPF
BILIRUB UR QL STRIP: NEGATIVE
CARDIAC TROPONIN I PNL SERPL HS: 5 NG/L
CLARITY UR: CLEAR
COLOR UR: ABNORMAL
GLUCOSE UR STRIP-MCNC: NEGATIVE MG/DL
HGB UR QL STRIP.AUTO: ABNORMAL
KETONES UR STRIP-MCNC: NEGATIVE MG/DL
LEUKOCYTE ESTERASE UR QL STRIP: NEGATIVE
MUCOUS THREADS UR QL AUTO: ABNORMAL
NITRITE UR QL STRIP: NEGATIVE
NON-SQ EPI CELLS URNS QL MICRO: ABNORMAL /HPF
PH UR STRIP.AUTO: 6 [PH]
PROT UR STRIP-MCNC: ABNORMAL MG/DL
RBC #/AREA URNS AUTO: ABNORMAL /HPF
SP GR UR STRIP.AUTO: >=1.05 (ref 1–1.03)
UROBILINOGEN UR STRIP-ACNC: <2 MG/DL
WBC #/AREA URNS AUTO: ABNORMAL /HPF

## 2023-02-15 RX ORDER — ONDANSETRON 4 MG/1
4 TABLET, FILM COATED ORAL EVERY 8 HOURS PRN
Qty: 20 TABLET | Refills: 0 | Status: SHIPPED | OUTPATIENT
Start: 2023-02-15 | End: 2023-02-22

## 2023-02-15 RX ORDER — OXYCODONE HYDROCHLORIDE AND ACETAMINOPHEN 5; 325 MG/1; MG/1
1 TABLET ORAL EVERY 4 HOURS PRN
Qty: 20 TABLET | Refills: 0 | Status: SHIPPED | OUTPATIENT
Start: 2023-02-15 | End: 2023-02-19

## 2023-02-15 RX ORDER — TAMSULOSIN HYDROCHLORIDE 0.4 MG/1
0.4 CAPSULE ORAL
Qty: 10 CAPSULE | Refills: 0 | Status: SHIPPED | OUTPATIENT
Start: 2023-02-15 | End: 2023-02-25

## 2023-02-15 RX ADMIN — TAMSULOSIN HYDROCHLORIDE 0.4 MG: 0.4 CAPSULE ORAL at 00:23

## 2023-02-15 RX ADMIN — SODIUM CHLORIDE 1000 ML: 0.9 INJECTION, SOLUTION INTRAVENOUS at 00:26

## 2023-02-15 RX ADMIN — KETOROLAC TROMETHAMINE 15 MG: 30 INJECTION, SOLUTION INTRAMUSCULAR at 00:23

## 2023-02-15 NOTE — ED ATTENDING ATTESTATION
2/14/2023  IAida MD, saw and evaluated the patient  I have discussed the patient with the resident/non-physician practitioner and agree with the resident's/non-physician practitioner's findings, Plan of Care, and MDM as documented in the resident's/non-physician practitioner's note, except where noted  All available labs and Radiology studies were reviewed  I was present for key portions of any procedure(s) performed by the resident/non-physician practitioner and I was immediately available to provide assistance  At this point I agree with the current assessment done in the Emergency Department  I have conducted an independent evaluation of this patient a history and physical is as follows:  Patient is a 36year old male with worsening constant left flank with radiation to LLQ pain with nausea and sweating  No fever  No GI bleeding  No abdominal surgery  Has had prior diverticulitis  (+) hematuria with dark urine  Was last seen in this ED on 3/8/19 for diverticulitis  [facility] website checked on this patient and last Rx filled was on 5/12/21 for vicodin for 2 day supply  NCAT  Moist mucous membranes  Lungs clear  Heart regular without murmur  Abdomen soft with LLQ tenderness and L CVAT  No rash noted  Good bowel sounds  No edema  DDx including but not limited to: appendicitis, gastroenteritis, gastritis, PUD, GERD, gastroparesis, hepatitis, pancreatitis, colitis, enteritis, diverticulitis, food poisoning, epiploic appendagitis, mesenteric adenitis, mesenteric panniculitis, mesenteric ischemia, IBD, IBS, ileus, bowel obstruction, volvulus, internal hernia, cholecystitis, biliary colic, choledocholithiasis, perforated viscus, tumor, splenic etiology, constipation, doubt AAA, doubt testicular torsion; renal colic, pyelonephritis, UTI; doubt cardiac etiology  Will check labs, CT and give IV morphine and toradol         ED Course         Critical Care Time  Procedures

## 2023-02-15 NOTE — ED PROVIDER NOTES
History  Chief Complaint   Patient presents with   • Abdominal Pain     Reports left abdominal pain about 4 hours ago with dark urine and oliguria  Hx diverticulitis  37 yo M with PMHx L sided diverticulitis, presents for evaluation of progressively worsening, severe L sided abdominal pain radiating to the back with "dark urine" and difficulty having a bowel movement that started about 4 hours PTA  Pt reports the abdominal pain feels similar to previous diverticulitis type pain  No associated Sx fever/chills, N/V/D, melena, cough, HA, CP, LE edema/pain or any other Sx  Denies h/o prior renal stones  No other concerns  None       Past Medical History:   Diagnosis Date   • Diverticulosis        Past Surgical History:   Procedure Laterality Date   • US GUIDED VASCULAR ACCESS  10/16/2015       History reviewed  No pertinent family history  I have reviewed and agree with the history as documented  E-Cigarette/Vaping   • E-Cigarette Use Never User      E-Cigarette/Vaping Substances   • Nicotine No    • THC No    • CBD No      Social History     Tobacco Use   • Smoking status: Every Day   • Smokeless tobacco: Never   Vaping Use   • Vaping Use: Never used   Substance Use Topics   • Alcohol use: Yes     Comment: social   • Drug use: Never        Review of Systems   Constitutional: Negative for chills and fever  HENT: Negative for ear pain and sore throat  Eyes: Negative for pain and visual disturbance  Respiratory: Negative for cough and shortness of breath  Cardiovascular: Negative for chest pain and palpitations  Gastrointestinal: Positive for abdominal pain  Negative for blood in stool, diarrhea, nausea and vomiting  Tenesmus   Genitourinary: Positive for difficulty urinating and hematuria  Negative for dysuria  Musculoskeletal: Negative for arthralgias and back pain  Skin: Negative for color change and rash  Neurological: Negative for seizures and syncope     All other systems reviewed and are negative  Physical Exam  ED Triage Vitals   Temperature Pulse Respirations Blood Pressure SpO2   02/14/23 2147 02/14/23 2147 02/14/23 2147 02/14/23 2147 02/14/23 2147   98 4 °F (36 9 °C) 85 20 (!) 203/107 98 %      Temp Source Heart Rate Source Patient Position - Orthostatic VS BP Location FiO2 (%)   02/14/23 2147 02/14/23 2147 02/14/23 2300 02/14/23 2300 --   Oral Monitor Lying Right arm       Pain Score       02/14/23 2147       10 - Worst Possible Pain             Orthostatic Vital Signs  Vitals:    02/14/23 2147 02/14/23 2245 02/14/23 2300 02/15/23 0245   BP: (!) 203/107 152/97 156/78 149/91   Pulse: 85 85 75 82   Patient Position - Orthostatic VS:   Lying Lying       Physical Exam  Vitals and nursing note reviewed  Constitutional:       General: He is in acute distress  Appearance: He is well-developed  HENT:      Head: Normocephalic and atraumatic  Mouth/Throat:      Mouth: Mucous membranes are moist    Eyes:      Extraocular Movements: Extraocular movements intact  Conjunctiva/sclera: Conjunctivae normal    Cardiovascular:      Rate and Rhythm: Normal rate and regular rhythm  Heart sounds: Normal heart sounds  No murmur heard  Pulmonary:      Effort: Pulmonary effort is normal  No respiratory distress  Breath sounds: Normal breath sounds  Abdominal:      General: Bowel sounds are normal       Palpations: Abdomen is soft  Tenderness: There is generalized abdominal tenderness and tenderness in the left upper quadrant  There is right CVA tenderness  There is no guarding or rebound  Negative signs include Breaux's sign, Rovsing's sign, McBurney's sign, psoas sign and obturator sign  Hernia: No hernia is present  Comments: Generalized L sided abdominal TTP with minimal L CVA tenderness   Musculoskeletal:         General: No swelling  Cervical back: Neck supple  Skin:     General: Skin is warm and dry        Capillary Refill: Capillary refill takes less than 2 seconds  Neurological:      Mental Status: He is alert and oriented to person, place, and time     Psychiatric:         Mood and Affect: Mood normal          ED Medications  Medications   sodium chloride 0 9 % bolus 1,000 mL (0 mL Intravenous Stopped 2/15/23 0026)   morphine injection 4 mg (4 mg Intravenous Given 2/14/23 2234)   iohexol (OMNIPAQUE) 350 MG/ML injection (SINGLE-DOSE) 100 mL (100 mL Intravenous Given 2/14/23 2334)   ketorolac (TORADOL) injection 15 mg (15 mg Intravenous Given 2/15/23 0023)   tamsulosin (FLOMAX) capsule 0 4 mg (0 4 mg Oral Given 2/15/23 0023)   sodium chloride 0 9 % bolus 1,000 mL (0 mL Intravenous Stopped 2/15/23 0345)       Diagnostic Studies  Results Reviewed     Procedure Component Value Units Date/Time    Urine Microscopic [[id number]]  (Abnormal) Collected: 02/15/23 0244    Lab Status: Final result Specimen: Urine, Clean Catch Updated: 02/15/23 0403     RBC, UA Innumerable /hpf      WBC, UA 4-10 /hpf      Epithelial Cells Occasional /hpf      Bacteria, UA None Seen /hpf      MUCUS THREADS Occasional     Amorphous Crystals, UA Occasional    HS Troponin I 2hr [[id number]]  (Normal) Collected: 02/15/23 0244    Lab Status: Final result Specimen: Blood from Arm, Left Updated: 02/15/23 0329     hs TnI 2hr 5 ng/L      Delta 2hr hsTnI 0 ng/L     UA w Reflex to Microscopic w Reflex to Culture [[id number]]  (Abnormal) Collected: 02/15/23 0244    Lab Status: Final result Specimen: Urine, Clean Catch Updated: 02/15/23 0318     Color, UA Light Yellow     Clarity, UA Clear     Specific Gravity, UA >=1 050     pH, UA 6 0     Leukocytes, UA Negative     Nitrite, UA Negative     Protein, UA Trace mg/dl      Glucose, UA Negative mg/dl      Ketones, UA Negative mg/dl      Urobilinogen, UA <2 0 mg/dl      Bilirubin, UA Negative     Occult Blood, UA Large    HS Troponin 0hr (reflex protocol) [[id number]]  (Normal) Collected: 02/14/23 2241    Lab Status: Final result Specimen: Blood from Arm, Left Updated: 02/14/23 2331     hs TnI 0hr 5 ng/L     Lipase [[id number]]  (Normal) Collected: 02/14/23 2241    Lab Status: Final result Specimen: Blood from Arm, Left Updated: 02/14/23 2324     Lipase 29 u/L     Comprehensive metabolic panel [[id number]] Collected: 02/14/23 2241    Lab Status: Final result Specimen: Blood from Arm, Left Updated: 02/14/23 2323     Sodium 139 mmol/L      Potassium 4 1 mmol/L      Chloride 106 mmol/L      CO2 25 mmol/L      ANION GAP 8 mmol/L      BUN 20 mg/dL      Creatinine 0 90 mg/dL      Glucose 109 mg/dL      Calcium 9 0 mg/dL      AST 16 U/L      ALT 22 U/L      Alkaline Phosphatase 57 U/L      Total Protein 7 4 g/dL      Albumin 4 2 g/dL      Total Bilirubin 0 32 mg/dL      eGFR 104 ml/min/1 73sq m     Narrative:      [place] guidelines for Chronic Kidney Disease (CKD):   •  Stage 1 with normal or high GFR (GFR > 90 mL/min/1 73 square meters)  •  Stage 2 Mild CKD (GFR = 60-89 mL/min/1 73 square meters)  •  Stage 3A Moderate CKD (GFR = 45-59 mL/min/1 73 square meters)  •  Stage 3B Moderate CKD (GFR = 30-44 mL/min/1 73 square meters)  •  Stage 4 Severe CKD (GFR = 15-29 mL/min/1 73 square meters)  •  Stage 5 End Stage CKD (GFR <15 mL/min/1 73 square meters)  Note: GFR calculation is accurate only with a steady state creatinine    Lactic acid [[id number]]  (Normal) Collected: 02/14/23 2241    Lab Status: Final result Specimen: Blood from Arm, Left Updated: 02/14/23 2323     LACTIC ACID 1 0 mmol/L     Narrative:      Result may be elevated if tourniquet was used during collection      Laisha Turcios [[id number]]  (Normal) Collected: 02/14/23 2241    Lab Status: Final result Specimen: Blood from Arm, Left Updated: 02/14/23 2318     Protime 12 1 seconds      INR 0 88    APTT [[id number]]  (Normal) Collected: 02/14/23 2241    Lab Status: Final result Specimen: Blood from Arm, Left Updated: 02/14/23 2318     PTT 28 seconds     Blood culture #1 [[id number]] Collected: 02/14/23 2250    Lab Status: In process Specimen: Blood from Arm, Right Updated: 02/14/23 2308    Blood culture #2 [[id number]] Collected: 02/14/23 2241    Lab Status: In process Specimen: Blood from Line, Venous Updated: 02/14/23 2308    CBC and differential [[id number]]  (Abnormal) Collected: 02/14/23 2241    Lab Status: Final result Specimen: Blood from Arm, Left Updated: 02/14/23 2304     WBC 10 40 Thousand/uL      RBC 4 81 Million/uL      Hemoglobin 14 4 g/dL      Hematocrit 44 2 %      MCV 92 fL      MCH 29 9 pg      MCHC 32 6 g/dL      RDW 14 1 %      MPV 10 0 fL      Platelets 878 Thousands/uL      nRBC 0 /100 WBCs      Neutrophils Relative 59 %      Immat GRANS % 0 %      Lymphocytes Relative 28 %      Monocytes Relative 7 %      Eosinophils Relative 5 %      Basophils Relative 1 %      Neutrophils Absolute 6 11 Thousands/µL      Immature Grans Absolute 0 04 Thousand/uL      Lymphocytes Absolute 2 90 Thousands/µL      Monocytes Absolute 0 77 Thousand/µL      Eosinophils Absolute 0 52 Thousand/µL      Basophils Absolute 0 06 Thousands/µL                  CT abdomen pelvis with contrast   ED Interpretation by Nicolette Mejia MD (02/15 7858)   CT ABDOMEN AND PELVIS WITH IV CONTRAST     INDICATION:   Abdominal pain, acute, nonlocalized  L sided abdominal pain; h/o diverticulitis; concern for complicated diverticulitis or renal colic with stone      COMPARISON:  CT 3/8/19     TECHNIQUE:  CT examination of the abdomen and pelvis was performed  Axial, sagittal, and coronal 2D reformatted images were created from the source data and submitted for interpretation      Radiation dose length product (DLP) for this visit:  662 mGy-cm     This examination, like all CT scans performed in the [facility], was performed utilizing techniques to minimize radiation dose exposure, including the use of iterative   reconstruction and automated exposure control      IV Contrast:  100 mL of iohexol (OMNIPAQUE)  Enteric Contrast:  Enteric contrast was not administered      FINDINGS:     ABDOMEN     LOWER CHEST:  No clinically significant abnormality identified in the visualized lower chest      LIVER/BILIARY TREE:  Unremarkable         GALLBLADDER:  No calcified gallstones  No pericholecystic inflammatory change      SPLEEN:  Unremarkable      PANCREAS:  Unremarkable      ADRENAL GLANDS:  Unremarkable      KIDNEYS/URETERS:  Mild left hydronephrosis from a thin elongated 6 mm stone in the proximal ureter  Delayed left nephrogram     STOMACH AND BOWEL:  There is colonic diverticulosis without evidence of acute diverticulitis      APPENDIX:  A normal appendix was visualized      ABDOMINOPELVIC CAVITY:  No ascites  No pneumoperitoneum  No lymphadenopathy      VESSELS:  Atherosclerotic, large soft tissue plaque within the distal aorta series 301/101     PELVIS     REPRODUCTIVE ORGANS:  Unremarkable for patient's age      URINARY BLADDER:  Unremarkable      ABDOMINAL WALL/INGUINAL REGIONS:  Unremarkable      OSSEOUS STRUCTURES:  No acute fracture or destructive osseous lesion      IMPRESSION:     1   6 mm left proximal ureteral stone causes mild hydronephrosis  2  Aortic atherosclerosis, more than expected for age            Final Result by Silvina Jarvis MD (02/15 0050)      1   6 mm left proximal ureteral stone causes mild hydronephrosis   2    Aortic atherosclerosis, more than expected for age            Workstation performed: [id number]         XR chest 1 view portable   ED Interpretation by Desi Durand MD (67/44 0094)   Nonacute            Procedures  ECG 12 Lead Documentation Only    Date/Time: 2/14/2023 10:55 PM  Performed by: Desi Durand MD  Authorized by: Desi Durand MD     Indications / Diagnosis:  Abdominal pain  ECG reviewed by me, the ED Provider: yes    Patient location:  ED  Previous ECG:     Previous ECG:  Unavailable    Comparison to cardiac monitor: Yes    Interpretation:     Interpretation: normal    Rate:     ECG rate:  78    ECG rate assessment: normal    Rhythm:     Rhythm: sinus rhythm    Ectopy:     Ectopy: none    QRS:     QRS axis:  Normal    QRS intervals:  Normal  Conduction:     Conduction: normal    ST segments:     ST segments:  Normal  T waves:     T waves: normal            ED Course  ED Course as of 02/15/23 0820   Wed Feb 15, 2023   0131 Reports pain is significantly improved; urinating now to provide sample  Stable  Discussed CT findings of 6mm L midureteral calculus             HEART Risk Score    Flowsheet Row Most Recent Value   Heart Score Risk Calculator    History 1 Filed at: 02/15/2023 0811   ECG 0 Filed at: 02/15/2023 0508   Age 0 Filed at: 02/15/2023 7546   Risk Factors 1 Filed at: 02/15/2023 0811   Troponin 0 Filed at: 02/15/2023 0267   HEART Score 2 Filed at: 02/15/2023 3972              PERC Rule for PE    Flowsheet Row Most Recent Value   PERC Rule for PE    Age >=50 0 Filed at: 02/15/2023 0811   HR >=100 0 Filed at: 02/15/2023 0811   O2 Sat on room air < 95% 0 Filed at: 02/15/2023 6103   History of PE or DVT 0 Filed at: 02/15/2023 5694   Recent trauma or surgery 0 Filed at: 02/15/2023 6832   Hemoptysis 0 Filed at: 02/15/2023 0408   Exogenous estrogen 0 Filed at: 02/15/2023 8505   Unilateral leg swelling 0 Filed at: 02/15/2023 1718   PERC Rule for PE Results 0 Filed at: 02/15/2023 9640              SBIRT 22yo+    Flowsheet Row Most Recent Value   SBIRT (25 yo +)    In order to provide better care to our patients, we are screening all of our patients for alcohol and drug use  Would it be okay to ask you these screening questions? No Filed at: 02/14/2023 8736                Medical Decision Making  Pt remained stable throughout ED course despite initial presentation of distress 2/2 abdominal pain  Pt was given 4 mg Morphine initially which improved Sx significantly and thereafter was provided 15 mg Toradol IV which provided further relief    CBC, CMP unremarkable- no kidney injury or significant leukocytosis  Troponin 5, repeat 5  Lipase WNL  EKG, CXR WNL  Doubt ACS,   UA remarkable for large blood, RBC and WBC with few WBC, indicative of acute ureteral injury 2/2 acute 6 mm mid ureteral calculus with mild hydronephrosis on L  CT revealed diverticulosis but no evidence of acute diverticulitis despite history  Given pt's improved state and reassuring w/u, pt will trial passing the stone at home with close f/u with urology and PCP  Referral to urology sent and pt was provided Rx percocet, flomax and zofran  He was instructed to drink plenty of fluids and to strain urine  RTER precautions discussed  Pt understands and agreed with plan  All questions answered  No other concerns  Abdominal pain: acute illness or injury  Diverticulosis: acute illness or injury  Nausea: acute illness or injury  Renal colic: acute illness or injury  Ureteral stone with hydronephrosis: acute illness or injury  Amount and/or Complexity of Data Reviewed  Labs: ordered  Radiology: ordered  Risk  Prescription drug management              Disposition  Final diagnoses:   Ureteral stone with hydronephrosis   Renal colic   Abdominal pain   Diverticulosis   Nausea     Time reflects when diagnosis was documented in both MDM as applicable and the Disposition within this note     Time User Action Codes Description Comment    2/15/2023  3:41 AM Son-CarlottaEfrain Add [K57 20] Diverticulitis of large intestine with perforation without bleeding     2/15/2023  3:41 AM Son-Carlotta, Nicole Add [N13 2] Ureteral stone with hydronephrosis     2/15/2023  3:41 AM Son-Carlotta, Nicole Add [X69] Renal colic     1/79/6412  4:50 AM Son-Carlotta, Nicole Add [R10 9] Abdominal pain     2/15/2023  4:20 AM Son-Carlotta, Nicole Modify [K57 20] Diverticulitis of large intestine with perforation without bleeding     2/15/2023  4:20 AM Son-Carlotta, Nicole Modify [N13 2] Ureteral stone with hydronephrosis     2/15/2023  4:20 AM Son-Carlotta, Nicole Add [K57 90] Diverticulosis 2/15/2023  4:21 AM [place] Sahil Modify [N13 2] Ureteral stone with hydronephrosis     2/15/2023  4:21 AM Son-Nicole Laguerre Remove [K57 20] Diverticulitis of large intestine with perforation without bleeding     2/15/2023  4:22 AM Ladan Sahil Add [R11 0] Nausea       ED Disposition     ED Disposition   Discharge    Condition   Stable    Date/Time   Wed Feb 15, 2023  3:38 AM    Comment   Fatmata Sanford discharge to home/self care                 Follow-up Information     Follow up With Specialties Details Why Contact Info Additional Information    Randa Palomino MD Internal Medicine Call in 2 days As needed [address] [address]        [address] Emergency Department Emergency Medicine Go to  If symptoms worsen as discussed [address] [facility] [address] [facility], [address], [place], [place], [address] [facility] [place] [facility] Call in 2 days As needed Nhan Mcghee [address] [facility] [phone]  [phone]  [facility] [place], [address] [place], [place], [address] [facility]          Discharge Medication List as of 2/15/2023  4:23 AM      START taking these medications    Details   ondansetron (ZOFRAN) 4 mg tablet Take 1 tablet (4 mg total) by mouth every 8 (eight) hours as needed for nausea or vomiting for up to 7 days, Starting Wed 2/15/2023, Until Wed 2/22/2023 at 2359, Normal      oxyCODONE-acetaminophen (PERCOCET) 5-325 mg per tablet Take 1 tablet by mouth every 4 (four) hours as needed for moderate pain for up to 4 days Max Daily Amount: 6 tablets, Starting Wed 2/15/2023, Until Sun 2/19/2023 at 2359, Normal      tamsulosin (FLOMAX) 0 4 mg Take 1 capsule (0 4 mg total) by mouth daily with dinner for 10 days, Starting Wed 2/15/2023, Until Sat 2/25/2023, Normal               PDMP Review Value Time User    PDMP Reviewed  Yes 2/14/2023 10:05 PM Jes Murillo MD           ED Provider  Attending physically available and evaluated Jamie Gusman  I managed the patient along with the ED Attending      Electronically Signed by         Kimberly Bullock MD  02/15/23 7705

## 2023-02-17 LAB
ATRIAL RATE: 78 BPM
P AXIS: 48 DEGREES
PR INTERVAL: 156 MS
QRS AXIS: 51 DEGREES
QRSD INTERVAL: 96 MS
QT INTERVAL: 360 MS
QTC INTERVAL: 410 MS
T WAVE AXIS: 21 DEGREES
VENTRICULAR RATE: 78 BPM

## 2023-02-19 LAB
BACTERIA BLD CULT: NORMAL
BACTERIA BLD CULT: NORMAL

## 2023-02-20 LAB
BACTERIA BLD CULT: NORMAL
BACTERIA BLD CULT: NORMAL

## 2023-03-17 ENCOUNTER — OFFICE VISIT (OUTPATIENT)
Dept: GASTROENTEROLOGY | Facility: CLINIC | Age: 43
End: 2023-03-17

## 2023-03-17 VITALS
BODY MASS INDEX: 31.26 KG/M2 | HEIGHT: 74 IN | DIASTOLIC BLOOD PRESSURE: 113 MMHG | HEART RATE: 113 BPM | WEIGHT: 243.6 LBS | SYSTOLIC BLOOD PRESSURE: 166 MMHG

## 2023-03-17 DIAGNOSIS — K57.90 DIVERTICULAR DISEASE: Primary | ICD-10-CM

## 2023-03-17 RX ORDER — CEFUROXIME AXETIL 500 MG/1
500 TABLET ORAL 2 TIMES DAILY
COMMUNITY
Start: 2023-03-15 | End: 2023-03-25

## 2023-03-17 RX ORDER — KETOROLAC TROMETHAMINE 10 MG/1
10 TABLET, FILM COATED ORAL EVERY 6 HOURS PRN
COMMUNITY
Start: 2023-03-08

## 2023-03-17 NOTE — PROGRESS NOTES
[facility] - Outpatient Follow-up Note  Rosie Floyd 39 y o  male MRN: [id number]  Encounter: [id number]          ASSESSMENT AND PLAN:      1  Diverticular disease  Continue fiber supplementation  If symptoms of recurrence, we will begin a liquid diet and contact this office  Repeat colonoscopy in 2029    2  Hypertension  Blood pressure is quite elevated today  He is asymptomatic  Blood pressure was quite elevated at 208/103 in the emergency room several weeks ago but normalized back to 127/74 at an office visit on March 8  He will follow-up with his primary care provider and if he develops symptoms will present to the emergency room    ______________________________________________________________________    SUBJECTIVE: Patient with a history of perforated diverticulitis in 2019 had recurrent symptoms of left-sided abdominal pain and hematuria and presented to the emergency room where CT scan revealed nephrolithiasis  No evidence of acute diverticulitis  He is scheduled for cystoscopy  Colonoscopy in 2019 was unremarkable  Taking a fiber supplement daily      REVIEW OF SYSTEMS:    Review of Systems   Gastrointestinal: Positive for abdominal pain  Historical Information   Past Medical History:   Diagnosis Date   • Diverticulosis      Past Surgical History:   Procedure Laterality Date   • US GUIDED VASCULAR ACCESS  10/16/2015     Social History   Social History     Substance and Sexual Activity   Alcohol Use Yes    Comment: social     Social History     Substance and Sexual Activity   Drug Use Never     Social History     Tobacco Use   Smoking Status Every Day   Smokeless Tobacco Never     History reviewed  No pertinent family history      Meds/Allergies       Current Outpatient Medications:   •  cefuroxime (CEFTIN) 500 mg tablet  •  ketorolac (TORADOL) 10 mg tablet  •  ondansetron (ZOFRAN) 4 mg tablet  •  tamsulosin (FLOMAX) 0 4 mg    No Known Allergies        Objective     Blood pressure (!) 181/108, pulse (!) 116, height 6' 2" (1 88 m), weight 110 kg (243 lb 9 6 oz)  Body mass index is 31 28 kg/m²  PHYSICAL EXAM:      Physical Exam     Lab Results:   No visits with results within 1 Day(s) from this visit  Latest known visit with results is:   Admission on 02/14/2023, Discharged on 02/15/2023   Component Date Value   • WBC 02/14/2023 10 40 (H)    • RBC 02/14/2023 4 81    • Hemoglobin 02/14/2023 14 4    • Hematocrit 02/14/2023 44 2    • MCV 02/14/2023 92    • MCH 02/14/2023 29 9    • MCHC 02/14/2023 32 6    • RDW 02/14/2023 14 1    • MPV 02/14/2023 10 0    • Platelets 30/51/4494 324    • nRBC 02/14/2023 0    • Neutrophils Relative 02/14/2023 59    • Immat GRANS % 02/14/2023 0    • Lymphocytes Relative 02/14/2023 28    • Monocytes Relative 02/14/2023 7    • Eosinophils Relative 02/14/2023 5    • Basophils Relative 02/14/2023 1    • Neutrophils Absolute 02/14/2023 6  11    • Immature Grans Absolute 02/14/2023 0 04    • Lymphocytes Absolute 02/14/2023 2 90    • Monocytes Absolute 02/14/2023 0 77    • Eosinophils Absolute 02/14/2023 0 52    • Basophils Absolute 02/14/2023 0 06    • Sodium 02/14/2023 139    • Potassium 02/14/2023 4 1    • Chloride 02/14/2023 106    • CO2 02/14/2023 25    • ANION GAP 02/14/2023 8    • BUN 02/14/2023 20    • Creatinine 02/14/2023 0 90    • Glucose 02/14/2023 109    • Calcium 02/14/2023 9 0    • AST 02/14/2023 16    • ALT 02/14/2023 22    • Alkaline Phosphatase 02/14/2023 57    • Total Protein 02/14/2023 7 4    • Albumin 02/14/2023 4 2    • Total Bilirubin 02/14/2023 0 32    • eGFR 02/14/2023 104    • Lipase 02/14/2023 29    • LACTIC ACID 02/14/2023 1 0    • Blood Culture 02/14/2023 No Growth After 5 Days  • Blood Culture 02/14/2023 No Growth After 5 Days      • Color, UA 02/15/2023 Light Yellow    • Clarity, UA 02/15/2023 Clear    • Specific Gravity,  02/15/2023 >=1 050 (H)    • pH, UA 02/15/2023 6 0    • Leukocytes, UA 02/15/2023 Negative    • Nitrite, UA 02/15/2023 Negative    • Protein, UA 02/15/2023 Trace (A)    • Glucose, UA 02/15/2023 Negative    • Ketones, UA 02/15/2023 Negative    • Urobilinogen, UA 02/15/2023 <2 0    • Bilirubin, UA 02/15/2023 Negative    • Occult Blood, UA 02/15/2023 Large (A)    • hs TnI 0hr 02/14/2023 5    • Protime 02/14/2023 12 1    • INR 02/14/2023 0 88    • PTT 02/14/2023 28    • Ventricular Rate 02/14/2023 78    • Atrial Rate 02/14/2023 78    • AZ Interval 02/14/2023 156    • QRSD Interval 02/14/2023 96    • QT Interval 02/14/2023 360    • QTC Interval 02/14/2023 410    • P Axis 02/14/2023 48    • QRS Axis 02/14/2023 51    • T Wave Axis 02/14/2023 21    • hs TnI 2hr 02/15/2023 5    • Delta 2hr hsTnI 02/15/2023 0    • RBC, UA 02/15/2023 Innumerable (A)    • WBC, UA 02/15/2023 4-10 (A)    • Epithelial Cells 02/15/2023 Occasional    • Bacteria, UA 02/15/2023 None Seen    • MUCUS THREADS 02/15/2023 Occasional (A)    • Amorphous Crystals, UA 02/15/2023 Occasional          Radiology Results:   No results found

## 2024-03-07 ENCOUNTER — TELEPHONE (OUTPATIENT)
Dept: GASTROENTEROLOGY | Facility: CLINIC | Age: 44
End: 2024-03-07

## 2024-03-07 NOTE — TELEPHONE ENCOUNTER
I called patient and left message to c/b to schedule f/u appt. Please schedule when patient calls back. Thank you

## 2024-05-08 LAB
BASOPHILS # BLD AUTO: 0.05 THOUSANDS/ÂΜL (ref 0–0.1)
BASOPHILS NFR BLD AUTO: 0 % (ref 0–1)
EOSINOPHIL # BLD AUTO: 0.2 THOUSAND/ÂΜL (ref 0–0.61)
EOSINOPHIL NFR BLD AUTO: 2 % (ref 0–6)
ERYTHROCYTE [DISTWIDTH] IN BLOOD BY AUTOMATED COUNT: 15.3 % (ref 11.6–15.1)
HCT VFR BLD AUTO: 44.7 % (ref 36.5–49.3)
HGB BLD-MCNC: 14.6 G/DL (ref 12–17)
IMM GRANULOCYTES # BLD AUTO: 0.05 THOUSAND/UL (ref 0–0.2)
IMM GRANULOCYTES NFR BLD AUTO: 0 % (ref 0–2)
LYMPHOCYTES # BLD AUTO: 1.83 THOUSANDS/ÂΜL (ref 0.6–4.47)
LYMPHOCYTES NFR BLD AUTO: 14 % (ref 14–44)
MCH RBC QN AUTO: 29.4 PG (ref 26.8–34.3)
MCHC RBC AUTO-ENTMCNC: 32.7 G/DL (ref 31.4–37.4)
MCV RBC AUTO: 90 FL (ref 82–98)
MONOCYTES # BLD AUTO: 1.31 THOUSAND/ÂΜL (ref 0.17–1.22)
MONOCYTES NFR BLD AUTO: 10 % (ref 4–12)
NEUTROPHILS # BLD AUTO: 9.87 THOUSANDS/ÂΜL (ref 1.85–7.62)
NEUTS SEG NFR BLD AUTO: 74 % (ref 43–75)
NRBC BLD AUTO-RTO: 0 /100 WBCS
PLATELET # BLD AUTO: 275 THOUSANDS/UL (ref 149–390)
PMV BLD AUTO: 9.6 FL (ref 8.9–12.7)
RBC # BLD AUTO: 4.96 MILLION/UL (ref 3.88–5.62)
WBC # BLD AUTO: 13.31 THOUSAND/UL (ref 4.31–10.16)

## 2024-05-08 PROCEDURE — 36415 COLL VENOUS BLD VENIPUNCTURE: CPT

## 2024-05-08 PROCEDURE — 85025 COMPLETE CBC W/AUTO DIFF WBC: CPT | Performed by: EMERGENCY MEDICINE

## 2024-05-08 PROCEDURE — 80053 COMPREHEN METABOLIC PANEL: CPT | Performed by: EMERGENCY MEDICINE

## 2024-05-08 PROCEDURE — 83690 ASSAY OF LIPASE: CPT | Performed by: EMERGENCY MEDICINE

## 2024-05-08 PROCEDURE — 99284 EMERGENCY DEPT VISIT MOD MDM: CPT

## 2024-05-09 ENCOUNTER — NURSE TRIAGE (OUTPATIENT)
Age: 44
End: 2024-05-09

## 2024-05-09 ENCOUNTER — HOSPITAL ENCOUNTER (EMERGENCY)
Facility: HOSPITAL | Age: 44
Discharge: HOME | End: 2024-05-09
Attending: EMERGENCY MEDICINE
Payer: COMMERCIAL

## 2024-05-09 ENCOUNTER — APPOINTMENT (EMERGENCY)
Dept: CT IMAGING | Facility: HOSPITAL | Age: 44
End: 2024-05-09
Payer: COMMERCIAL

## 2024-05-09 VITALS
SYSTOLIC BLOOD PRESSURE: 170 MMHG | DIASTOLIC BLOOD PRESSURE: 95 MMHG | OXYGEN SATURATION: 95 % | TEMPERATURE: 98 F | RESPIRATION RATE: 20 BRPM | HEART RATE: 88 BPM

## 2024-05-09 DIAGNOSIS — K57.92 DIVERTICULITIS: Primary | ICD-10-CM

## 2024-05-09 LAB
ALBUMIN SERPL BCP-MCNC: 4.1 G/DL (ref 3.5–5)
ALP SERPL-CCNC: 57 U/L (ref 34–104)
ALT SERPL W P-5'-P-CCNC: 17 U/L (ref 7–52)
ANION GAP SERPL CALCULATED.3IONS-SCNC: 7 MMOL/L (ref 4–13)
AST SERPL W P-5'-P-CCNC: 12 U/L (ref 13–39)
BACTERIA UR QL AUTO: ABNORMAL /HPF
BILIRUB SERPL-MCNC: 1.13 MG/DL (ref 0.2–1)
BILIRUB UR QL STRIP: NEGATIVE
BUN SERPL-MCNC: 13 MG/DL (ref 5–25)
CALCIUM SERPL-MCNC: 9.1 MG/DL (ref 8.4–10.2)
CHLORIDE SERPL-SCNC: 102 MMOL/L (ref 96–108)
CLARITY UR: CLEAR
CO2 SERPL-SCNC: 27 MMOL/L (ref 21–32)
COLOR UR: YELLOW
CREAT SERPL-MCNC: 0.63 MG/DL (ref 0.6–1.3)
GFR SERPL CREATININE-BSD FRML MDRD: 119 ML/MIN/1.73SQ M
GLUCOSE SERPL-MCNC: 106 MG/DL (ref 65–140)
GLUCOSE UR STRIP-MCNC: NEGATIVE MG/DL
HGB UR QL STRIP.AUTO: NEGATIVE
KETONES UR STRIP-MCNC: ABNORMAL MG/DL
LEUKOCYTE ESTERASE UR QL STRIP: NEGATIVE
LIPASE SERPL-CCNC: 10 U/L (ref 11–82)
MUCOUS THREADS UR QL AUTO: ABNORMAL
NITRITE UR QL STRIP: NEGATIVE
NON-SQ EPI CELLS URNS QL MICRO: ABNORMAL /HPF
PH UR STRIP.AUTO: 6 [PH]
POTASSIUM SERPL-SCNC: 3.6 MMOL/L (ref 3.5–5.3)
PROT SERPL-MCNC: 7.4 G/DL (ref 6.4–8.4)
PROT UR STRIP-MCNC: ABNORMAL MG/DL
RBC #/AREA URNS AUTO: ABNORMAL /HPF
SODIUM SERPL-SCNC: 136 MMOL/L (ref 135–147)
SP GR UR STRIP.AUTO: >=1.05 (ref 1–1.03)
UROBILINOGEN UR STRIP-ACNC: <2 MG/DL
WBC #/AREA URNS AUTO: ABNORMAL /HPF

## 2024-05-09 PROCEDURE — 96374 THER/PROPH/DIAG INJ IV PUSH: CPT

## 2024-05-09 PROCEDURE — 96361 HYDRATE IV INFUSION ADD-ON: CPT

## 2024-05-09 PROCEDURE — 99285 EMERGENCY DEPT VISIT HI MDM: CPT

## 2024-05-09 PROCEDURE — 81001 URINALYSIS AUTO W/SCOPE: CPT | Performed by: EMERGENCY MEDICINE

## 2024-05-09 PROCEDURE — 74177 CT ABD & PELVIS W/CONTRAST: CPT

## 2024-05-09 RX ORDER — AMOXICILLIN AND CLAVULANATE POTASSIUM 875; 125 MG/1; MG/1
1 TABLET, FILM COATED ORAL ONCE
Status: COMPLETED | OUTPATIENT
Start: 2024-05-09 | End: 2024-05-09

## 2024-05-09 RX ORDER — KETOROLAC TROMETHAMINE 30 MG/ML
15 INJECTION, SOLUTION INTRAMUSCULAR; INTRAVENOUS ONCE
Status: CANCELLED | OUTPATIENT
Start: 2024-05-09 | End: 2024-05-09

## 2024-05-09 RX ORDER — KETOROLAC TROMETHAMINE 30 MG/ML
15 INJECTION, SOLUTION INTRAMUSCULAR; INTRAVENOUS ONCE
Status: COMPLETED | OUTPATIENT
Start: 2024-05-09 | End: 2024-05-09

## 2024-05-09 RX ORDER — AMOXICILLIN AND CLAVULANATE POTASSIUM 875; 125 MG/1; MG/1
1 TABLET, FILM COATED ORAL EVERY 12 HOURS
Qty: 14 TABLET | Refills: 0 | Status: SHIPPED | OUTPATIENT
Start: 2024-05-09 | End: 2024-05-16

## 2024-05-09 RX ADMIN — IOHEXOL 100 ML: 350 INJECTION, SOLUTION INTRAVENOUS at 00:59

## 2024-05-09 RX ADMIN — KETOROLAC TROMETHAMINE 15 MG: 30 INJECTION, SOLUTION INTRAMUSCULAR; INTRAVENOUS at 01:15

## 2024-05-09 RX ADMIN — AMOXICILLIN AND CLAVULANATE POTASSIUM 1 TABLET: 875; 125 TABLET, FILM COATED ORAL at 01:58

## 2024-05-09 RX ADMIN — SODIUM CHLORIDE 1000 ML: 0.9 INJECTION, SOLUTION INTRAVENOUS at 01:17

## 2024-05-09 NOTE — TELEPHONE ENCOUNTER
Please call and inform patient to be on clear liquid diet until symptoms improve.  He may take acetaminophen extra strength as needed for pain do not exceed the recommended dosage.  May use heating pad as needed.  He just went to the emergency room today and was prescribed Augmentin.  It would take a couple days for the antibiotics to help him feel better.  He can address FMLA  paperwork on follow-up visit.  GI can provide him with a note excusing him from work. If his symptoms get worse despite being on antibiotics, he has fever,  nausea, or vomiting then he should report back to the emergency room for further evaluation because he may need to be started on IV antibiotics.  Thank you

## 2024-05-09 NOTE — Clinical Note
Orlin Elena was seen and treated in our emergency department on 5/8/2024.                Diagnosis:     Orlin  may return to work on return date.    He may return on this date: 05/10/2024         If you have any questions or concerns, please don't hesitate to call.      Tyesha Booker PA-C    ______________________________           _______________          _______________  Hospital Representative                              Date                                Time

## 2024-05-09 NOTE — TELEPHONE ENCOUNTER
Regarding: abdominal and flank pain  ----- Message from Gayle Chowdhury sent at 5/9/2024 11:51 AM EDT -----  Patients spouse, Lali, calling in regards to patient. Patient is experiencing abdominal and flank pain for the past two days. Did go to the ER yesterday and was told he was in the early stages of diverticulitis. Patient is still experiencing pain. Patient is scheduled for a follow up on 6/3/24 and is placed on the waitlist.  Is there anything else that can be done in the meantime. Also, patient is missing work due to the symtpoms and is asking if [facility] paperwork would be able to be completed.

## 2024-05-09 NOTE — DISCHARGE INSTRUCTIONS
Please take your antibiotic please take your antibiotic twice a day for the next 7 days.  Follow-up with your primary care provider.  Advance your diet as you are able to tolerate it.  Return to the emergency department if you experience more severe pain, blood in your bowel movements or are unable to tolerate food or water.

## 2024-05-09 NOTE — TELEPHONE ENCOUNTER
Call patient and find out which days he has not gone to work and provide him with a note for those days.  Thank you

## 2024-05-09 NOTE — ED PROVIDER NOTES
History  Chief Complaint   Patient presents with    Abdominal Pain     Pt reports lower abdominal and left flank pain starting last night. Denies N/V/D. Hx diverticulitis.      Orlin Elena is a 44 y.o. male with a PMH of diverticulitis and kidney stones presenting to the ED on May 9, 2024 with abdominal pain. Patient endorses that he started having lower abdominal and left-sided flank pain starting last evening.  He states that he has had diverticulitis in the past and that this feels similar however due to the flank pain he is also concerned he may have a kidney stone as he has had those in the past as well and required a lithotripsy for that. Patient denies fevers, chills, nausea, vomiting, diarrhea, dysuria, urgency and frequency, chest pain, shortness of breath or any other complaints at this time.         Abdominal Pain  Associated symptoms: no chest pain, no chills, no constipation, no cough, no diarrhea, no dysuria, no fever, no hematuria, no nausea, no shortness of breath, no sore throat and no vomiting        Prior to Admission Medications   Prescriptions Last Dose Informant Patient Reported? Taking?   ketorolac (TORADOL) 10 mg tablet   Yes No   Sig: Take 10 mg by mouth every 6 (six) hours as needed   ondansetron (ZOFRAN) 4 mg tablet   No No   Sig: Take 1 tablet (4 mg total) by mouth every 8 (eight) hours as needed for nausea or vomiting for up to 7 days   tamsulosin (FLOMAX) 0.4 mg   No No   Sig: Take 1 capsule (0.4 mg total) by mouth daily with dinner for 10 days      Facility-Administered Medications: None       Past Medical History:   Diagnosis Date    Diverticulosis        Past Surgical History:   Procedure Laterality Date    US GUIDED VASCULAR ACCESS  10/16/2015       History reviewed. No pertinent family history.  I have reviewed and agree with the history as documented.    E-Cigarette/Vaping    E-Cigarette Use Never User      E-Cigarette/Vaping Substances    Nicotine No     THC No     CBD No       Social History     Tobacco Use    Smoking status: Every Day    Smokeless tobacco: Never   Vaping Use    Vaping status: Never Used   Substance Use Topics    Alcohol use: Yes     Comment: social    Drug use: Never       Review of Systems   Constitutional:  Positive for appetite change. Negative for chills and fever.        Decreased appetite   HENT:  Negative for congestion and sore throat.    Eyes:  Negative for discharge and redness.   Respiratory:  Negative for cough and shortness of breath.    Cardiovascular:  Negative for chest pain and palpitations.   Gastrointestinal:  Positive for abdominal pain. Negative for abdominal distention, constipation, diarrhea, nausea and vomiting.   Genitourinary:  Positive for flank pain. Negative for difficulty urinating, dysuria, frequency, hematuria and urgency.   Musculoskeletal:  Negative for back pain and neck pain.   Skin:  Negative for color change and rash.   Neurological:  Negative for seizures and syncope.   All other systems reviewed and are negative.      Physical Exam  Physical Exam  Vitals and nursing note reviewed.   Constitutional:       General: He is not in acute distress.     Appearance: Normal appearance. He is well-developed and normal weight. He is not ill-appearing, toxic-appearing or diaphoretic.   HENT:      Head: Normocephalic and atraumatic.      Right Ear: External ear normal.      Left Ear: External ear normal.      Nose: Nose normal. No congestion or rhinorrhea.      Mouth/Throat:      Mouth: Mucous membranes are moist.   Eyes:      General: No scleral icterus.        Right eye: No discharge.         Left eye: No discharge.      Extraocular Movements: Extraocular movements intact.      Conjunctiva/sclera: Conjunctivae normal.   Cardiovascular:      Rate and Rhythm: Normal rate and regular rhythm.      Heart sounds: Normal heart sounds. No murmur heard.     No friction rub. No gallop.   Pulmonary:      Effort: Pulmonary effort is normal. No  respiratory distress.      Breath sounds: Normal breath sounds. No wheezing, rhonchi or rales.   Abdominal:      General: Abdomen is flat. Bowel sounds are normal. There is no distension. There are no signs of injury.      Palpations: Abdomen is soft. There is no mass.      Tenderness: There is abdominal tenderness in the right lower quadrant and left lower quadrant. There is left CVA tenderness, guarding and rebound. There is no right CVA tenderness.   Musculoskeletal:         General: No signs of injury. Normal range of motion.      Cervical back: Normal range of motion and neck supple. No rigidity.   Skin:     General: Skin is warm.      Capillary Refill: Capillary refill takes less than 2 seconds.      Coloration: Skin is not pale.      Findings: No erythema.   Neurological:      General: No focal deficit present.      Mental Status: He is alert and oriented to person, place, and time. Mental status is at baseline.      Motor: No weakness.      Gait: Gait normal.   Psychiatric:         Mood and Affect: Mood normal.         Behavior: Behavior normal.         Vital Signs  ED Triage Vitals   Temperature Pulse Respirations Blood Pressure SpO2   05/08/24 2343 05/08/24 2343 05/08/24 2343 05/08/24 2343 05/08/24 2343   98 °F (36.7 °C) 103 20 (!) 177/106 94 %      Temp Source Heart Rate Source Patient Position - Orthostatic VS BP Location FiO2 (%)   05/08/24 2343 05/08/24 2343 05/08/24 2343 05/08/24 2343 --   Oral Monitor Sitting Right arm       Pain Score       05/09/24 0115       9           Vitals:    05/08/24 2343 05/09/24 0207   BP: (!) 177/106 170/95   Pulse: 103 88   Patient Position - Orthostatic VS: Sitting Lying         Visual Acuity      ED Medications  Medications   ketorolac (TORADOL) injection 15 mg (15 mg Intravenous Given 5/9/24 0115)   sodium chloride 0.9 % bolus 1,000 mL (0 mL Intravenous Stopped 5/9/24 0215)   iohexol (OMNIPAQUE) 350 MG/ML injection (MULTI-DOSE) 100 mL (100 mL Intravenous Given 5/9/24  0059)   amoxicillin-clavulanate (AUGMENTIN) 875-125 mg per tablet 1 tablet (1 tablet Oral Given 5/9/24 0158)       Diagnostic Studies  Results Reviewed       Procedure Component Value Units Date/Time    Urine Microscopic [[id number]]  (Abnormal) Collected: 05/09/24 0118    Lab Status: Final result Specimen: Urine, Clean Catch Updated: 05/09/24 0208     RBC, UA 1-2 /hpf      WBC, UA 1-2 /hpf      Epithelial Cells None Seen /hpf      Bacteria, UA Occasional /hpf      MUCUS THREADS Occasional    UA w Reflex to Microscopic w Reflex to Culture [[id number]]  (Abnormal) Collected: 05/09/24 0118    Lab Status: Final result Specimen: Urine, Clean Catch Updated: 05/09/24 0132     Color, UA Yellow     Clarity, UA Clear     Specific Gravity, UA >=1.050     pH, UA 6.0     Leukocytes, UA Negative     Nitrite, UA Negative     Protein, UA Trace mg/dl      Glucose, UA Negative mg/dl      Ketones, UA 10 (1+) mg/dl      Urobilinogen, UA <2.0 mg/dl      Bilirubin, UA Negative     Occult Blood, UA Negative    Comprehensive metabolic panel [[id number]]  (Abnormal) Collected: 05/08/24 2350    Lab Status: Final result Specimen: Blood from Arm, Right Updated: 05/09/24 0015     Sodium 136 mmol/L      Potassium 3.6 mmol/L      Chloride 102 mmol/L      CO2 27 mmol/L      ANION GAP 7 mmol/L      BUN 13 mg/dL      Creatinine 0.63 mg/dL      Glucose 106 mg/dL      Calcium 9.1 mg/dL      AST 12 U/L      ALT 17 U/L      Alkaline Phosphatase 57 U/L      Total Protein 7.4 g/dL      Albumin 4.1 g/dL      Total Bilirubin 1.13 mg/dL      eGFR 119 ml/min/1.73sq m     Narrative:      National Kidney Disease Foundation guidelines for Chronic Kidney Disease (CKD):     Stage 1 with normal or high GFR (GFR > 90 mL/min/1.73 square meters)    Stage 2 Mild CKD (GFR = 60-89 mL/min/1.73 square meters)    Stage 3A Moderate CKD (GFR = 45-59 mL/min/1.73 square meters)    Stage 3B Moderate CKD (GFR = 30-44 mL/min/1.73 square meters)    Stage 4 Severe CKD (GFR = 15-29  mL/min/1.73 square meters)    Stage 5 End Stage CKD (GFR <15 mL/min/1.73 square meters)  Note: GFR calculation is accurate only with a steady state creatinine    Lipase [[id number]]  (Abnormal) Collected: 05/08/24 2350    Lab Status: Final result Specimen: Blood from Arm, Right Updated: 05/09/24 0015     Lipase 10 u/L     CBC and differential [[id number]]  (Abnormal) Collected: 05/08/24 2350    Lab Status: Final result Specimen: Blood from Arm, Right Updated: 05/08/24 2358     WBC 13.31 Thousand/uL      RBC 4.96 Million/uL      Hemoglobin 14.6 g/dL      Hematocrit 44.7 %      MCV 90 fL      MCH 29.4 pg      MCHC 32.7 g/dL      RDW 15.3 %      MPV 9.6 fL      Platelets 275 Thousands/uL      nRBC 0 /100 WBCs      Segmented % 74 %      Immature Grans % 0 %      Lymphocytes % 14 %      Monocytes % 10 %      Eosinophils Relative 2 %      Basophils Relative 0 %      Absolute Neutrophils 9.87 Thousands/µL      Absolute Immature Grans 0.05 Thousand/uL      Absolute Lymphocytes 1.83 Thousands/µL      Absolute Monocytes 1.31 Thousand/µL      Eosinophils Absolute 0.20 Thousand/µL      Basophils Absolute 0.05 Thousands/µL                    CT abdomen pelvis with contrast   Final Result by Macario Gold MD (05/09 0114)      Findings consistent with acute to subacute sigmoid diverticulitis and colitis. No evidence of bowel obstruction, perforation or abscess.         Workstation performed: [id number]                    Procedures  Procedures         ED Course                                             Medical Decision Making  Patient seen and examined noted to have diverticulitis.  Patient was first nursed a lipase which was 10, CMP which was unremarkable, CBC which showed a elevated white blood cell count at 13.31.  UA showed no signs of infection.  CT scan was ordered which revealed findings consistent with diverticulitis and colitis no evidence of obstruction perforation or abscess.  Patient was given fluids and a dose  "of Augmentin here in the department.  He will continue his course of antibiotics at home.  Strict return precautions were given which he expresses understanding of.    Differential diagnosis includes but is not limited to appendicitis, gastroenteritis, gastritis, PUD, GERD, gastroparesis, hepatitis, pancreatitis, colitis, enteritis, diverticulitis, food poisoning, epiploic appendagitis, mesenteric adenitis, mesenteric panniculitis, mesenteric ischemia, IBD, IBS, ileus, bowel obstruction, volvulus, internal hernia, cholecystitis, biliary colic, choledocholithiasis, perforated viscus, tumor, splenic etiology, constipation, AAA, testicular torsion, renal colic, pyelonephritis, UTI; doubt cardiac etiology.     Patient's labs notable for: mentioned above    Imaging revealed:   Mentioned above    Patient appears well, is nontoxic appearing, he expresses understanding and agrees with plan of care at this time.  In light of this patient would benefit from outpatient management.    Patient was rx'd: augmentin    Patient at time of discharge well-appearing in no acute distress, all questions answered. Patient agreeable to plan.  Patient's vitals, lab/imaging results, diagnosis, and treatment plan were discussed with the patient. All new/changed medications were discussed with patient, specifically, route of administration, how often and when to take, and where they can be picked up. Strict return precautions as well as close follow up with PCP was discussed with the patient and the patient was agreeable to my recommendations. Patient verbally acknowledged understanding of the above communications. Strict return precautions were provided. All labs reviewed and utilized in the medical decision making process (if labs were ordered). Portions of the record may have been created with voice recognition software.  Occasional wrong word or \"sound a like\" substitutions may have occurred due to the inherent limitations of voice " recognition software.  Read the chart carefully and recognize, using context, where substitutions have occurred.      Amount and/or Complexity of Data Reviewed  Labs: ordered.  Radiology: ordered.    Risk  Prescription drug management.             Disposition  Final diagnoses:   Diverticulitis     Time reflects when diagnosis was documented in both MDM as applicable and the Disposition within this note       Time User Action Codes Description Comment    5/9/2024  1:34 AM Tyesha Booker Add [K57.92] Diverticulitis     5/9/2024  2:11 AM MagazineTyesha man Add [K57.20] Diverticulitis of large intestine with perforation without bleeding     5/9/2024  2:11 AM MagazineTyesha man Remove [K57.20] Diverticulitis of large intestine with perforation without bleeding           ED Disposition       ED Disposition   Discharge    Condition   Stable    Date/Time   Thu May 9, 2024  2:10 AM    Comment   Orlin Elena discharge to home/self care.                   Follow-up Information       Follow up With Specialties Details Why Contact Info Additional Information    Krishna Perry MD Internal Medicine   [address] [place] [address]  [address]  [place] [address]  [phone]       [facility] Emergency Medicine   [facility] [address]  [phone] [facility], [address] [place] [address], [phone]    Garcia Patton MD Gastroenterology Call   [address] [address] [place] [address]  [phone]               Discharge Medication List as of 5/9/2024  2:12 AM        START taking these medications    Details   amoxicillin-clavulanate (AUGMENTIN) 875-125 mg per tablet Take 1 tablet by mouth every 12 (twelve) hours for 7 days, Starting Thu 5/9/2024, Until Thu 5/16/2024, Normal           CONTINUE these medications which have NOT CHANGED    Details   ketorolac (TORADOL) 10 mg tablet Take 10 mg by mouth every 6 (six) hours as needed,  Starting Wed 3/8/2023, Historical Med      ondansetron (ZOFRAN) 4 mg tablet Take 1 tablet (4 mg total) by mouth every 8 (eight) hours as needed for nausea or vomiting for up to 7 days, Starting Wed 2/15/2023, Until Wed 2/22/2023 at 2359, Normal      tamsulosin (FLOMAX) 0.4 mg Take 1 capsule (0.4 mg total) by mouth daily with dinner for 10 days, Starting Wed 2/15/2023, Until Sat 2/25/2023, Normal             No discharge procedures on file.    PDMP Review         Value Time User    PDMP Reviewed  Yes 2/14/2023 10:05 PM Ean Casas MD            ED Provider  Electronically Signed by             Tyesha Booker PA-C  05/09/24 [id number]

## 2024-05-09 NOTE — TELEPHONE ENCOUNTER
"Last ov 3/17/ Dr. Patton, Procedure colon 2019, [facility] 24 CT Findings consistent with acute to subacute sigmoid diverticulitis and colitis. No evidence of bowel obstruction, perforation or abscess. Discharged on Augmentin.    Last patient communication sheet completed 3/17/23 which is now . I returned call and spoke with Mrs. Elena, she was not with her  to provide verbal consent to speak with her. She is aware of HIPAA and just wanted to provide additional information.    He continues with pain and was not provided with pain relief from ER just the Augmentin. Ibuprofen not helping. He is following clear liquid diet. He needs FMLA paperwork filled out for work and primary care declined referring to GI to complete with diagnosis and she knows no one will fill it out until patient seen. He has to report back to work tomorrow even with symptoms since FMLA not completed. Next urgent appointment is 24. Please review/advise.      Reason for Disposition   Information only question and nurse able to answer    Answer Assessment - Initial Assessment Questions  1. REASON FOR CALL or QUESTION: \"What is your reason for calling today?\" or \"How can I best help you?\" or \"What question do you have that I can help answer?\"      Patient requires ED follow up sooner than offered.    Protocols used: Information Only Call - No Triage-ADULT-[place]    "

## 2024-05-10 NOTE — TELEPHONE ENCOUNTER
Spoke with patient. Will need note  just for today due to getting note from ER for yesterday. He will  note at . Regarding fmla paperwork the dx is diverticuliits so I explained it will be up to the provider as far as filling out due to new protocol.

## 2024-06-03 ENCOUNTER — OFFICE VISIT (OUTPATIENT)
Dept: GASTROENTEROLOGY | Facility: CLINIC | Age: 44
End: 2024-06-03
Payer: COMMERCIAL

## 2024-06-03 VITALS
WEIGHT: 250 LBS | HEIGHT: 74 IN | HEART RATE: 89 BPM | SYSTOLIC BLOOD PRESSURE: 183 MMHG | BODY MASS INDEX: 32.08 KG/M2 | DIASTOLIC BLOOD PRESSURE: 97 MMHG

## 2024-06-03 DIAGNOSIS — K57.20 DIVERTICULITIS OF LARGE INTESTINE WITH PERFORATION WITHOUT BLEEDING: Primary | ICD-10-CM

## 2024-06-03 PROCEDURE — 99214 OFFICE O/P EST MOD 30 MIN: CPT | Performed by: PHYSICIAN ASSISTANT

## 2024-06-03 RX ORDER — OLOPATADINE HYDROCHLORIDE 1 MG/ML
1 SOLUTION/ DROPS OPHTHALMIC 2 TIMES DAILY
COMMUNITY
Start: 2024-04-12 | End: 2025-04-12

## 2024-06-03 NOTE — PROGRESS NOTES
[facility] - Outpatient Consultation  Orlin Elena 44 y.o. male MRN: [id number]  Encounter: [id number]          ASSESSMENT AND PLAN:      1. Diverticulitis of large intestine with perforation without bleeding  44-year-old male who presents for follow-up to CAT scan and had lower abdominal pain and flank pain last month and imaging did show findings consistent with diverticulitis and colitis, will need repeat colonoscopy for further evaluation.  Patient may have segmental colitis associated with diverticulosis.  As below, did have colitis on prior pathology as he did have sigmoid erythema.   Will plan for colonoscopy for further evaluation, I explained to him possibility of scad, continue diet in interim, advised to contact us if pain recurs around the time of the procedure.    ______________________________________________________________________    HPI:      44 y.o. male with a PMH of diverticulitis and kidney stones presenting to the ED on May 9, 2024 with abdominal pain. Patient reported having lower abdominal and left-sided flank pain and stated that he has had diverticulitis in the past and that this feels similar however due to the flank pain he was also concerned he may have a kidney stone as he has had those in the past as well and required a lithotripsy for that. Patient denied fevers, chills, nausea, vomiting, diarrhea.  CT as below did show findings consistent with acute to subacute sigmoid diverticulitis and colitis.  He did have colonoscopy performed in 2019 and at that time was noted to have left-sided diverticulosis and moderate patchy sigmoid erythema and biopsies were obtained with recommended colonoscopy in 10 years.  Pathology was consistent with findings consistent with colitis and comment did show colonic mucosa with crypt abscesses consistent with moderate active colitis and findings were nonspecific.  Could be colitis which was self-limited acute colitis or  colitis associated with diverticular disease or early inflammatory bowel disease.  When patient was seen in ER he was treated with Augmentin twice daily for a week.  Patient did have mild leukocytosis of 13,000 in ER. He currently reports he has no complaints and has been watching his diet.  Patient reports he has been eating a lot of greens and did have some diarrheal symptoms shortly after the episode but now has been doing well.  He reports that during that episode he was having difficulty moving his bowels and that is why he got concerned and went to the ER.  Denies any upper GI symptoms.    CT May 2024-    Findings consistent with acute to subacute sigmoid diverticulitis and colitis. No evidence of bowel obstruction, perforation or abscess.       REVIEW OF SYSTEMS:    CONSTITUTIONAL: Denies any fever, chills, rigors, and weight loss.  HEENT: No earache or tinnitus. Denies hearing loss or visual disturbances.  CARDIOVASCULAR: No chest pain or palpitations.   RESPIRATORY: Denies any cough, hemoptysis, shortness of breath or dyspnea on exertion.  GASTROINTESTINAL: As noted in the History of Present Illness.   GENITOURINARY: No problems with urination. Denies any hematuria or dysuria.  NEUROLOGIC: No dizziness or vertigo, denies headaches.   MUSCULOSKELETAL: Denies any muscle or joint pain.   SKIN: Denies skin rashes or itching.   ENDOCRINE: Denies excessive thirst. Denies intolerance to heat or cold.  PSYCHOSOCIAL: Denies depression or anxiety. Denies any recent memory loss.       Historical Information   Past Medical History:   Diagnosis Date    Diverticulosis      Past Surgical History:   Procedure Laterality Date    US GUIDED VASCULAR ACCESS  10/16/2015     Social History   Social History     Substance and Sexual Activity   Alcohol Use Yes    Comment: social     Social History     Substance and Sexual Activity   Drug Use Never     Social History     Tobacco Use   Smoking Status Every Day   Smokeless Tobacco  Never     No family history on file.    Meds/Allergies       Current Outpatient Medications:     ketorolac (TORADOL) 10 mg tablet    ondansetron (ZOFRAN) 4 mg tablet    tamsulosin (FLOMAX) 0.4 mg    No Known Allergies        Objective     There were no vitals taken for this visit. There is no height or weight on file to calculate BMI.        PHYSICAL EXAM:      General Appearance:   Alert, cooperative, no distress   HEENT:   Normocephalic, atraumatic, anicteric.     Neck:  Supple, symmetrical, trachea midline   Lungs:   Clear to auscultation bilaterally; no rales, rhonchi or wheezing; respirations unlabored    Heart::   Regular rate and rhythm; no murmur, rub, or gallop.   Abdomen:   Soft, non-tender, non-distended; normal bowel sounds; no masses, no organomegaly    Genitalia:   Deferred    Rectal:   Deferred    Extremities:  No cyanosis, clubbing or edema    Pulses:  2+ and symmetric    Skin:  No jaundice, rashes, or lesions    Lymph nodes:  No palpable cervical lymphadenopathy        Lab Results:   No visits with results within 1 Day(s) from this visit.   Latest known visit with results is:   Admission on 05/09/2024, Discharged on 05/09/2024   Component Date Value    WBC 05/08/2024 13.31 (H)     RBC 05/08/2024 4.96     Hemoglobin 05/08/2024 14.6     Hematocrit 05/08/2024 44.7     MCV 05/08/2024 90     MCH 05/08/2024 29.4     MCHC 05/08/2024 32.7     RDW 05/08/2024 15.3 (H)     MPV 05/08/2024 9.6     Platelets 05/08/2024 275     nRBC 05/08/2024 0     Segmented % 05/08/2024 74     Immature Grans % 05/08/2024 0     Lymphocytes % 05/08/2024 14     Monocytes % 05/08/2024 10     Eosinophils Relative 05/08/2024 2     Basophils Relative 05/08/2024 0     Absolute Neutrophils 05/08/2024 9.87 (H)     Absolute Immature Grans 05/08/2024 0.05     Absolute Lymphocytes 05/08/2024 1.83     Absolute Monocytes 05/08/2024 1.31 (H)     Eosinophils Absolute 05/08/2024 0.20     Basophils Absolute 05/08/2024 0.05     Sodium 05/08/2024  136     Potassium 05/08/2024 3.6     Chloride 05/08/2024 102     CO2 05/08/2024 27     ANION GAP 05/08/2024 7     BUN 05/08/2024 13     Creatinine 05/08/2024 0.63     Glucose 05/08/2024 106     Calcium 05/08/2024 9.1     AST 05/08/2024 12 (L)     ALT 05/08/2024 17     Alkaline Phosphatase 05/08/2024 57     Total Protein 05/08/2024 7.4     Albumin 05/08/2024 4.1     Total Bilirubin 05/08/2024 1.13 (H)     eGFR 05/08/2024 119     Lipase 05/08/2024 10 (L)     Color, UA 05/09/2024 Yellow     Clarity, UA 05/09/2024 Clear     Specific Gravity, UA 05/09/2024 >=1.050 (H)     pH, UA 05/09/2024 6.0     Leukocytes, UA 05/09/2024 Negative     Nitrite, UA 05/09/2024 Negative     Protein, UA 05/09/2024 Trace (A)     Glucose, UA 05/09/2024 Negative     Ketones, UA 05/09/2024 10 (1+) (A)     Urobilinogen, UA 05/09/2024 <2.0     Bilirubin, UA 05/09/2024 Negative     Occult Blood, UA 05/09/2024 Negative     RBC, UA 05/09/2024 1-2     WBC, UA 05/09/2024 1-2     Epithelial Cells 05/09/2024 None Seen     Bacteria, UA 05/09/2024 Occasional     MUCUS THREADS 05/09/2024 Occasional (A)          Radiology Results:   CT abdomen pelvis with contrast    Result Date: 5/9/2024  Narrative: CT ABDOMEN AND PELVIS WITH IV CONTRAST INDICATION: Abdominal pain. COMPARISON: 2/14/2023. TECHNIQUE: CT examination of the abdomen and pelvis was performed. Multiplanar 2D reformatted images were created from the source data. This examination, like all CT scans performed in the [facility], was performed utilizing techniques to minimize radiation dose exposure, including the use of iterative reconstruction and automated exposure control. Radiation dose length product (DLP) for this visit: 1253 mGy-cm IV Contrast: 100 mL of iohexol (OMNIPAQUE) Enteric Contrast: Not administered. FINDINGS: ABDOMEN LOWER CHEST: Clear lung bases. LIVER/BILIARY TREE: Unremarkable. GALLBLADDER: No calcified gallstones. No pericholecystic inflammatory change. SPLEEN:  Unremarkable. PANCREAS: Unremarkable. ADRENAL GLANDS: Unremarkable. KIDNEYS/URETERS: Symmetric nephrographic phase enhancement of the kidneys. No obstructive uropathy. STOMACH AND BOWEL: Extensive mesenteric fat surrounding the sigmoid colon. Numerous sigmoid diverticuli. Mild underlying colonic wall thickening. Stranding distended fluid-filled loops of small bowel in the mid abdomen without evidence of obstruction. APPENDIX: Normal appendix. ABDOMINOPELVIC CAVITY: No free intraperitoneal air or fluid collection. No lymphadenopathy. VESSELS: No abdominal aortic aneurysm. Patent portal, splenic and mesenteric veins. PELVIS REPRODUCTIVE ORGANS: No prostate enlargement. URINARY BLADDER: Unremarkable. ABDOMINAL WALL/INGUINAL REGIONS: Unremarkable. BONES: No acute fracture or suspicious osseous lesion.     Impression: Findings consistent with acute to subacute sigmoid diverticulitis and colitis. No evidence of bowel obstruction, perforation or abscess. Workstation performed: [id number]

## 2024-06-03 NOTE — PATIENT INSTRUCTIONS
Scheduled date of colonoscopy (as of today):9/23/2024  Physician performing colonoscopy:Dr. Patton  Location of colonoscopy:[place]  Bowel prep reviewed with patient:Miralax  Instructions reviewed with patient by:margie  Clearances: na

## 2024-09-16 ENCOUNTER — TELEPHONE (OUTPATIENT)
Age: 44
End: 2024-09-16

## 2024-09-21 ENCOUNTER — ANESTHESIA EVENT (OUTPATIENT)
Dept: ANESTHESIOLOGY | Facility: HOSPITAL | Age: 44
End: 2024-09-21

## 2024-09-21 ENCOUNTER — ANESTHESIA (OUTPATIENT)
Dept: ANESTHESIOLOGY | Facility: HOSPITAL | Age: 44
End: 2024-09-21

## 2024-09-21 NOTE — ANESTHESIA PREPROCEDURE EVALUATION
Procedure:  PRE-OP ONLY    Hx of corneal abrasion     ECG: NSR            Anesthesia Plan  ASA Score- 2     Anesthesia Type- IV sedation with anesthesia with ASA Monitors.         Additional Monitors:     Airway Plan:            Plan Factors-    Chart reviewed. EKG reviewed.  Existing labs reviewed. Patient summary reviewed.                  Induction-     Postoperative Plan-         Informed Consent-

## 2024-09-23 ENCOUNTER — ANESTHESIA (OUTPATIENT)
Dept: GASTROENTEROLOGY | Facility: HOSPITAL | Age: 44
End: 2024-09-23
Payer: COMMERCIAL

## 2024-09-23 ENCOUNTER — HOSPITAL ENCOUNTER (OUTPATIENT)
Dept: GASTROENTEROLOGY | Facility: HOSPITAL | Age: 44
Setting detail: OUTPATIENT SURGERY
Discharge: HOME | End: 2024-09-23
Payer: COMMERCIAL

## 2024-09-23 ENCOUNTER — ANESTHESIA EVENT (OUTPATIENT)
Dept: GASTROENTEROLOGY | Facility: HOSPITAL | Age: 44
End: 2024-09-23
Payer: COMMERCIAL

## 2024-09-23 VITALS
RESPIRATION RATE: 15 BRPM | HEIGHT: 74 IN | OXYGEN SATURATION: 95 % | TEMPERATURE: 97.8 F | HEART RATE: 64 BPM | SYSTOLIC BLOOD PRESSURE: 147 MMHG | DIASTOLIC BLOOD PRESSURE: 94 MMHG | WEIGHT: 257 LBS | BODY MASS INDEX: 32.98 KG/M2

## 2024-09-23 DIAGNOSIS — K57.20 DIVERTICULITIS OF LARGE INTESTINE WITH PERFORATION WITHOUT BLEEDING: ICD-10-CM

## 2024-09-23 PROCEDURE — 88305 TISSUE EXAM BY PATHOLOGIST: CPT | Performed by: STUDENT IN AN ORGANIZED HEALTH CARE EDUCATION/TRAINING PROGRAM

## 2024-09-23 PROCEDURE — 45380 COLONOSCOPY AND BIOPSY: CPT | Performed by: INTERNAL MEDICINE

## 2024-09-23 RX ORDER — LIDOCAINE HYDROCHLORIDE 10 MG/ML
INJECTION, SOLUTION EPIDURAL; INFILTRATION; INTRACAUDAL; PERINEURAL AS NEEDED
Status: DISCONTINUED | OUTPATIENT
Start: 2024-09-23 | End: 2024-09-23

## 2024-09-23 RX ORDER — PROPOFOL 10 MG/ML
INJECTION, EMULSION INTRAVENOUS AS NEEDED
Status: DISCONTINUED | OUTPATIENT
Start: 2024-09-23 | End: 2024-09-23

## 2024-09-23 RX ORDER — SODIUM CHLORIDE, SODIUM LACTATE, POTASSIUM CHLORIDE, CALCIUM CHLORIDE 600; 310; 30; 20 MG/100ML; MG/100ML; MG/100ML; MG/100ML
INJECTION, SOLUTION INTRAVENOUS CONTINUOUS PRN
Status: DISCONTINUED | OUTPATIENT
Start: 2024-09-23 | End: 2024-09-23

## 2024-09-23 RX ADMIN — LIDOCAINE HYDROCHLORIDE 25 MG: 10 INJECTION, SOLUTION EPIDURAL; INFILTRATION; INTRACAUDAL at 09:02

## 2024-09-23 RX ADMIN — PROPOFOL 50 MG: 10 INJECTION, EMULSION INTRAVENOUS at 09:12

## 2024-09-23 RX ADMIN — PROPOFOL 50 MG: 10 INJECTION, EMULSION INTRAVENOUS at 09:07

## 2024-09-23 RX ADMIN — PROPOFOL 50 MG: 10 INJECTION, EMULSION INTRAVENOUS at 09:04

## 2024-09-23 RX ADMIN — PROPOFOL 100 MG: 10 INJECTION, EMULSION INTRAVENOUS at 09:02

## 2024-09-23 RX ADMIN — PROPOFOL 50 MG: 10 INJECTION, EMULSION INTRAVENOUS at 09:09

## 2024-09-23 RX ADMIN — PROPOFOL 50 MG: 10 INJECTION, EMULSION INTRAVENOUS at 09:20

## 2024-09-23 RX ADMIN — PROPOFOL 50 MG: 10 INJECTION, EMULSION INTRAVENOUS at 09:14

## 2024-09-23 RX ADMIN — SODIUM CHLORIDE, SODIUM LACTATE, POTASSIUM CHLORIDE, AND CALCIUM CHLORIDE: .6; .31; .03; .02 INJECTION, SOLUTION INTRAVENOUS at 08:51

## 2024-09-23 NOTE — ANESTHESIA POSTPROCEDURE EVALUATION
Post-Op Assessment Note    CV Status:  Stable    Pain management: adequate       Mental Status:  Sleepy   Hydration Status:  Euvolemic   PONV Controlled:  Controlled   Airway Patency:  Patent     Post Op Vitals Reviewed: Yes    No anethesia notable event occurred.    Staff: CRNA               BP   136/87   Temp      Pulse  75   Resp   14   SpO2   98 FM 6L

## 2024-09-23 NOTE — H&P
History and Physical - [facility]  Orlin Elena 44 y.o. male MRN: [id number]                  HPI: Orlin Elena is a 44 y.o. year old male who presents for recent history of diverticulitis      REVIEW OF SYSTEMS: Per the HPI, and otherwise unremarkable.    Historical Information   Past Medical History:   Diagnosis Date    Diverticulitis of colon     Diverticulosis      Past Surgical History:   Procedure Laterality Date    COLONOSCOPY      US GUIDED VASCULAR ACCESS  10/16/2015     Social History   Social History     Substance and Sexual Activity   Alcohol Use Yes    Comment: social     Social History     Substance and Sexual Activity   Drug Use Never     Social History     Tobacco Use   Smoking Status Former    Current packs/day: 0.00    Types: Cigarettes    Quit date: 2021    Years since quitting: 3.7   Smokeless Tobacco Never     Family History   Problem Relation Age of Onset    Cancer Paternal Grandfather        Meds/Allergies       Current Outpatient Medications:     ketorolac (TORADOL) 10 mg tablet    olopatadine (PATANOL) 0.1 % ophthalmic solution    ondansetron (ZOFRAN) 4 mg tablet    No Known Allergies    Objective     There were no vitals taken for this visit.      PHYSICAL EXAM    Gen: NAD  Head: NCAT  CV: RRR  CHEST: Clear  ABD: soft, NT/ND  EXT: no edema      ASSESSMENT/PLAN:  This is a 44 y.o. year old male here for colonoscopy, and he is stable and optimized for his procedure.

## 2024-09-23 NOTE — ANESTHESIA PREPROCEDURE EVALUATION
Procedure:  COLONOSCOPY    Hx of corneal abrasion      ECG: NSR    Relevant Problems   No relevant active problems             Anesthesia Plan  ASA Score- 2     Anesthesia Type- IV sedation with anesthesia with ASA Monitors.         Additional Monitors:     Airway Plan:            Plan Factors-    Chart reviewed. EKG reviewed.  Existing labs reviewed. Patient summary reviewed.                  Induction-     Postoperative Plan-         Informed Consent-

## 2024-09-24 PROCEDURE — 88305 TISSUE EXAM BY PATHOLOGIST: CPT | Performed by: STUDENT IN AN ORGANIZED HEALTH CARE EDUCATION/TRAINING PROGRAM

## 2024-09-25 ENCOUNTER — TELEPHONE (OUTPATIENT)
Age: 44
End: 2024-09-25

## 2024-09-25 DIAGNOSIS — R10.32 LLQ PAIN: ICD-10-CM

## 2024-09-25 DIAGNOSIS — R19.7 DIARRHEA, UNSPECIFIED TYPE: ICD-10-CM

## 2024-09-25 DIAGNOSIS — K57.90 DIVERTICULAR DISEASE: Primary | ICD-10-CM

## 2024-09-25 NOTE — TELEPHONE ENCOUNTER
Left message (as per consent) explaining that provider has not reviewed the results at this time, informed it sometimes can take up to 2 weeks for provider to review results and once reviewed our office will reach out with any recommendations

## 2024-09-25 NOTE — TELEPHONE ENCOUNTER
Patients GI provider:  Dr. Patton    Number to return call: [phone]    Reason for call: Pt calling for results from his colonoscopy. He is working today and will try to call back on his lunch break    Scheduled procedure/appointment date if applicable: nothing at this time

## 2024-10-01 NOTE — RESULT ENCOUNTER NOTE
Please call the patient regarding results.  Colon biopsies show inflammation, likely related to diverticulosis.  Recommend high-fiber diet.  If he is having lower abdominal pain would consider a course of antibiotics.  Otherwise follow-up in the office

## 2024-10-02 RX ORDER — METRONIDAZOLE 500 MG/1
500 TABLET ORAL EVERY 8 HOURS SCHEDULED
Qty: 21 TABLET | Refills: 0 | Status: SHIPPED | OUTPATIENT
Start: 2024-10-02 | End: 2024-10-09

## 2024-10-02 RX ORDER — CIPROFLOXACIN 500 MG/1
500 TABLET, FILM COATED ORAL EVERY 12 HOURS SCHEDULED
Qty: 14 TABLET | Refills: 0 | Status: SHIPPED | OUTPATIENT
Start: 2024-10-02 | End: 2024-10-09

## 2024-10-02 NOTE — TELEPHONE ENCOUNTER
Patient called in, I went over his biopsy results. States that he's having loose stools, LLQ pain and bloating.   Per Dr. Patton's note patient may need antibiotics.   Patient states that he does has a history of diverticulitis.   Patient also aware that he should do a clear liquid diet for 24-48 hours and advance as tolerated.  He should also do low fiber for 2-3 day and switch to a high fiber diet.  Patient also instucted that he needs to stay on and maintain a high fiber diet to help prevent diverticulitis.   Patient verbalized understanding.  Patient scheduled for a his year visit with Dr. Griffiths in 8/25.    He verbalized that he will call back if the antibiotics are not working.

## 2024-10-04 ENCOUNTER — TELEPHONE (OUTPATIENT)
Dept: GASTROENTEROLOGY | Facility: CLINIC | Age: 44
End: 2024-10-04

## 2024-10-04 NOTE — TELEPHONE ENCOUNTER
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

## 2024-10-04 NOTE — TELEPHONE ENCOUNTER
Reached out to pt to review results, pt was at lab getting blood work and requested a return phone call later to review results.

## 2024-10-04 NOTE — TELEPHONE ENCOUNTER
----- Message from Garcia Patton MD sent at 10/1/2024  3:25 PM EDT -----  Please call the patient regarding results.  Colon biopsies show inflammation, likely related to diverticulosis.  Recommend high-fiber diet.  If he is having lower abdominal pain would consider a course of antibiotics.  Otherwise follow-up in the office

## 2024-10-04 NOTE — TELEPHONE ENCOUNTER
Spoke with wife. Pt started abx yesterday, having diarrhea mainly at night. Denies fever, denies signs of dehydration, urinating normal. Did cut down on caffeine, advised headache may be due to that, will drink gatorade. Was taking NSAID's for headache, advised to use Tylenol. Will call back to give update when abx is completed.     Pt requesting excuse from work 10/1-10/5. Requesting to  letter at office today or Monday.

## 2024-10-28 ENCOUNTER — OFFICE VISIT (OUTPATIENT)
Dept: GASTROENTEROLOGY | Facility: CLINIC | Age: 44
End: 2024-10-28
Payer: COMMERCIAL

## 2024-10-28 VITALS
BODY MASS INDEX: 32.98 KG/M2 | WEIGHT: 257 LBS | HEART RATE: 77 BPM | HEIGHT: 74 IN | DIASTOLIC BLOOD PRESSURE: 99 MMHG | SYSTOLIC BLOOD PRESSURE: 154 MMHG

## 2024-10-28 DIAGNOSIS — R19.7 DIARRHEA, UNSPECIFIED TYPE: Primary | ICD-10-CM

## 2024-10-28 DIAGNOSIS — K57.90 DIVERTICULAR DISEASE: ICD-10-CM

## 2024-10-28 DIAGNOSIS — K50.10 SEGMENTAL COLITIS ASSOCIATED WITH DIVERTICULOSIS  (HCC): ICD-10-CM

## 2024-10-28 DIAGNOSIS — K57.30 SEGMENTAL COLITIS ASSOCIATED WITH DIVERTICULOSIS  (HCC): ICD-10-CM

## 2024-10-28 PROCEDURE — 99214 OFFICE O/P EST MOD 30 MIN: CPT | Performed by: NURSE PRACTITIONER

## 2024-10-28 RX ORDER — MESALAMINE 800 MG/1
800 TABLET, DELAYED RELEASE ORAL 3 TIMES DAILY
Qty: 21 TABLET | Refills: 0 | Status: SHIPPED | OUTPATIENT
Start: 2024-10-28 | End: 2024-11-04

## 2024-10-28 NOTE — PROGRESS NOTES
[facility] [place] - Outpatient Follow-up Note  Orlin Elena 44 y.o. male MRN: [id number]  Encounter: [id number]          ASSESSMENT AND PLAN:      1. Diarrhea, unspecified type  2. Segmental colitis associated with diverticulosis  (HCC)  -Pt s/p colonoscopy 9/23/24 showing diverticulosis of moderate severity in the descending colon and sigmoid colon.  Mild, patchy erythematous mucosa in the sigmoid colon biopsied, likely scad.  -Sigmoid colon biopsy-colonic mucosa with active cryptitis, focal crypt abscess, and increased lamina propria lymphoplasmacytic inflammation which if confined to a segment of colon involved by diverticula, the histologic findings may represent diverticular disease related colitis, however differential diagnosis includes idiopathic inflammatory bowel disease, infection, and NSAID/drug associated colitis  -Patient completed a 7-day course of ciprofloxacin and Flagyl following colonoscopy as he was having left lower quadrant pain.  He reports pain resolved but then he developed diarrhea with upwards of 10 bowel movements a day which varied from liquid to formed.  -Obtain stool studies including C. difficile PCR, enteric panel, O&P to rule out infectious etiology  -If stool studies are negative, then will start on mesalamine 800 mg 3 times daily for 7-10 days for scad.  Patient will let us know if symptoms persist and then can increase course.  If symptoms persist in 2 to 4 weeks then can increase dose to 1600 mg 3 times daily.  -f/u in 3 months. Pt will reach out sooner via [employer] after finishing course of Mesalamine with an update  -Repeat colonoscopy due for screening in September 2029  -     mesalamine (ASACOL) 800 MG EC tablet; Take 1 tablet (800 mg total) by mouth 3 (three) times a day for 7 days      ______________________________________________________________________    SUBJECTIVE:  Orlin Elena is a 44 y.o. male with hx of diverticulitis, SCAD who presents  "for colonoscopy follow up. He completed a 7 day course of Cipro Flagyl on approximately 10/9 for SCAD which resolved his abdominal pain, but he is having diarrhea upward of 10x daily +  nocturnal symptoms, no rectal bleeding, or weight loss. Stools vary from liquid to to formed. Appetite is good, staying well hydrated. He works for [employer]. Has been getting FMLA through his PC but would like it to go through us. He didn't bring the paperwork this visit.         REVIEW OF SYSTEMS IS OTHERWISE NEGATIVE.      Historical Information   Past Medical History:   Diagnosis Date    Diverticulitis of colon     Diverticulosis      Past Surgical History:   Procedure Laterality Date    COLONOSCOPY      US GUIDED VASCULAR ACCESS  10/16/2015     Social History   Social History     Substance and Sexual Activity   Alcohol Use Yes    Comment: social     Social History     Substance and Sexual Activity   Drug Use Never     Social History     Tobacco Use   Smoking Status Former    Current packs/day: 0.00    Types: Cigarettes    Quit date: 2021    Years since quitting: 3.8   Smokeless Tobacco Never     Family History   Problem Relation Age of Onset    Cancer Paternal Grandfather        Meds/Allergies       Current Outpatient Medications:     mesalamine (ASACOL) 800 MG EC tablet    ketorolac (TORADOL) 10 mg tablet    olopatadine (PATANOL) 0.1 % ophthalmic solution    ondansetron (ZOFRAN) 4 mg tablet    No Known Allergies        Objective     Blood pressure 154/99, pulse 77, height 6' 2\" (1.88 m), weight 117 kg (257 lb). Body mass index is 33 kg/m².      PHYSICAL EXAM:      General Appearance:   Alert, cooperative, no distress   HEENT:   Normocephalic, atraumatic, anicteric.     Neck:  Supple, symmetrical, trachea midline   Lungs:   Clear to auscultation bilaterally; no rales, rhonchi or wheezing; respirations unlabored    Heart::   Regular rate and rhythm; no murmur.   Abdomen:   Soft, non-tender, " non-distended; normal bowel sounds; no masses, no organomegaly    Genitalia:   Deferred    Rectal:   Deferred    Extremities:  No cyanosis, clubbing or edema    Skin:  No jaundice, rashes, or lesions    Lymph nodes:  No palpable cervical lymphadenopathy        Lab Results:   No visits with results within 1 Day(s) from this visit.   Latest known visit with results is:   Hospital Outpatient Visit on 09/23/2024   Component Date Value    Case Report 09/23/2024                      Value:Surgical Pathology Report                         Case: [id number]                                  Authorizing Provider:  Garcia Patton MD         Collected:           09/23/2024 0919              Ordering Location:     [facility]   Received:            09/23/2024 1229                                     Endoscopy                                                                    Pathologist:           Armando Palma MD                                                            Specimen:    Large Intestine, Sigmoid Colon, cold bx, sigmoid, check for colitis                        Final Diagnosis 09/23/2024                      Value:A. Colon, sigmoid, biopsy:   - Colonic mucosa with active cryptitis, focal crypt abscess, and increased lamina propria lymphoplasmacytic inflammation; see Note.    Note: If confined to a segment of colon involved by diverticula, the histologic findings may represent diverticular disease related colitis, however the differential diagnosis includes idiopathic inflammatory bowel disease, infection, and NSAID/drug-associated colitis. Please correlate with clinical and endoscopic findings.      Additional Information 09/23/2024                      Value:All reported additional testing was performed with appropriately reactive controls.  These tests were developed and their performance characteristics determined by [facility] or appropriate performing facility, though some  "tests may be performed on tissues which have not been validated for performance characteristics (such as staining performed on alcohol exposed cell blocks and decalcified tissues).  Results should be interpreted with caution and in the context of the patients’ clinical condition. These tests may not be cleared or approved by the U.S. Food and Drug Administration, though the FDA has determined that such clearance or approval is not necessary. These tests are used for clinical purposes and they should not be regarded as investigational or for research. This laboratory has been approved by CLIA 88, designated as a high-complexity laboratory and is qualified to perform these tests.    Interpretation performed at [facility], [address] [address].      Gross Description 09/23/2024                      Value:A. The specimen is received in formalin, labeled with the patient's name and hospital number, and is designated \" sigmoid colon\".  The specimen consists of 3 tan-brown soft tissue fragments measuring in range of 0.2-0.7 cm in greatest dimension.  Entirely submitted. Screened cassette.    Note: The estimated total formalin fixation time based upon information provided by the submitting clinician and the standard processing schedule is under 72 hours.    -[facility]      Clinical Information 09/23/2024                      Value:INDICATION:  Diverticulitis of large intestine with perforation without bleeding   FINDINGS:  · Diverticula of moderate severity in the descending colon and sigmoid colon  · Mild, patchy erythematous mucosa measuring 5 mm in the sigmoid colon 25 cm from the anal verge; performed cold forceps biopsy. Likely SCAD         Radiology Results:   No results found.  "

## 2024-10-28 NOTE — PATIENT INSTRUCTIONS
-Decrease caffeine, energy drinks  -Stay well hydrated, avoid fatty/greasy foods  -Get stool studies done ASAP!! If negative, then we will start Mesalamine for treatment of SCAD. Please let us know how symptoms are after you complete the 7 day course  -Keep a low fiber diet until diarrhea improves then increase to high fiber  -Get us the [facility] paperwork      Patient Education     Low Fiber Diet   About this topic   A low fiber diet contains foods that are low in fiber and easy to digest. This makes it easier for your body to break down the food you eat. A low fiber diet means you are eating less than 13 grams of fiber a day. A low fiber diet will cause less pressure on your bowels and may help soothe your GI tract. You will also have fewer bowel movements.       When is this diet used?   Talk to your doctor before you start a low fiber diet. You should only use a low fiber diet for a short time until your signs improve. Then, once you are able to increase your fiber, slowly add foods back into your diet one at a time. A low fiber diet may also need extra nutrients if it is used over a longer period of time. Talk with your doctor about adding vitamin supplements to your diet.  Who should use this diet?   This diet may be helpful for people with:  Diverticulitis or inflammatory bowel disease, like Crohn's or ulcerative colitis, during times of flare-up  Gastroparesis  Short periods of bowel cramping or loose stools  Narrowing of the bowel  Rectal bleeding  Some kinds of surgery, like hemorrhoidectomy, colostomy, ileostomy, or abdominal surgery  What foods are good to eat?   Meats and proteins like:  Tender, well cooked meats  Chicken  Fish  Eggs  Smooth nut butters  Tofu  Breads and grains with less than 2 grams of fiber per serving like:  White bread  Crackers  Giovani crackers  Flour or corn tortillas  White rice, well-cooked  White pasta  English muffins  Cream of wheat or rice  Grits  Cold or hot cereals made from  white flour, such as puffed rice or corn flakes  Milk products like:  Milk  Cheese  Yogurt without nuts, fruit, and granola  Kefir  Ice cream  Sherbet  Fortified nondairy milks: Avis, cashew, coconut, or rice  Fruits and vegetables like:  Bananas  Applesauce  Melons without seeds  Fruit or vegetable juice without pulp  Soft, canned, or well-cooked fruits or vegetables without seeds, skins, or membranes  Tomato sauce  Mashed potatoes without skin  What foods should be limited or avoided?   Meats and proteins like:  [place] nut butters  Dried peas, beans, and lentils  Nuts or seeds  Fried meat, poultry, and fish  Steak, pork chops, and other meats that are fatty or have gristle  Seafood with a tough or rubbery texture, such as shrimp  Luncheon meats, such as bologna and salami  Sausage, hsieh, and hot dogs  Hummus  Breads and grains like:  Whole wheat breads, tortillas, crackers, and cereals  Brown rice  Quinoa, kasha, and barley  Breads or crackers with nuts or seeds  Whole grain and high-fiber cereals, including oatmeal, bran flakes, and shredded wheat  Whole wheat pasta  Popcorn  Milk products like:  Yogurt with added fruit, nuts, and granola  Fruits and vegetables like:  Raw or dried fruits or vegetables  Undercooked fruits or vegetables  Pineapple  Salads  Broccoli  Cauliflower  Corn  Potatoes with skin  Leafy greens like kale or shyanne greens  Dried fruit  Fruit or vegetable juice with pulp  Helpful tips   Sample Daily Menu   Breakfast Lunch Dinner   1/2 cup (120 mL) orange juice with no pulp Tuna fish sandwich on white bread 3 ounces (90 grams) broiled fish or chicken   1 cup (240 grams) cereal, like cheerios or corn flakes 1 cup (240 mL) tomato soup with saltine crackers 1/2 cup (120 grams) steamed vegetables, like squash, green beans, or carrots   1/2 cup (120 mL) low-fat milk 1/2 cup (120 grams) white rice or mashed potatoes, or medium baked potato (no skin) 1/2 cup (120 grams) white rice or mashed  potatoes, or medium baked potato (no skin)   1 piece white toast 1 piece white or rye bread 1 piece white or rye bread   1 teaspoon (5 grams) margarine or butter 1 teaspoon (5 grams) margarine or butter 1 teaspoon (5 grams) margarine or butter   poached egg or egg substitute banana 1/2 cup (120 mL) applesauce   Coffee or tea, with sugar and nondairy creamer 1/2 cup (120 mL) juice (apple, tomato) Coffee or tea, with sugar and nondairy creamer   Use breads and grains made from refined flour, pasta, and white rice. Do not use whole grain products.  Stay away from seeds, nuts, and raw or dried fruits. Also avoid raisins, berries, and foods that have these things in them.  Stay away from fresh fruits and vegetables that contain skins, seeds, and membranes.  Stay away from juices with pulp.  Remove skin from fruits and vegetables before cooking.  Stay away from prunes and prune juice.  Stay away from dried beans and lentils.  Limit milk and milk products if they make loose stools worse. Do not drink milk if you are lactose intolerant.  When you add fiber back into your diet be sure to:  Check with your doctor first.  Add fiber in slowly, like one meal per day.  Let your body adjust before you add in more fiber.  Stay hydrated to help move fiber in your bowels.  Last Reviewed Date   2021-09-23  Consumer Information Use and Disclaimer   This generalized information is a limited summary of diagnosis, treatment, and/or medication information. It is not meant to be comprehensive and should be used as a tool to help the user understand and/or assess potential diagnostic and treatment options. It does NOT include all information about conditions, treatments, medications, side effects, or risks that may apply to a specific patient. It is not intended to be medical advice or a substitute for the medical advice, diagnosis, or treatment of a health care provider based on the health care provider's examination and assessment of a  "patient’s specific and unique circumstances. Patients must speak with a health care provider for complete information about their health, medical questions, and treatment options, including any risks or benefits regarding use of medications. This information does not endorse any treatments or medications as safe, effective, or approved for treating a specific patient. UpToDate, Inc. and its affiliates disclaim any warranty or liability relating to this information or the use thereof. The use of this information is governed by the Terms of Use, available at https://www.[employer].com/en/know/clinical-effectiveness-terms   Copyright   Copyright © 2024 UpToDate, Inc. and its affiliates and/or licensors. All rights reserved.    Patient Education     High-fiber diet   The Basics   Written by the doctors and editors at [facility]   What is fiber? -- Fiber is a substance found in some fruits, vegetables, and grains. Most fiber passes through your body without being digested. But it can affect how you digest other foods, and it can also improve your bowel movements.  There are 2 kinds of fiber. One kind is called \"soluble fiber\" and is found in fruits, oats, barley, beans, and peas. The other kind is called \"insoluble fiber,\" and is found in wheat, rye, and other grains.  Both kinds of fiber that you eat are called \"dietary fiber.\"  Why is fiber important to my health? -- Fiber can help make your bowel movements softer and more regular. Adding fiber to your diet can help with problems including constipation, hemorrhoids, and diarrhea. Plus, it can help prevent \"accidents\" if you have trouble controlling your bowel movements.  Getting enough fiber can also help lower your risk of heart disease, stroke, and type 2 diabetes. That's because fiber can help lower cholesterol and help control blood sugar.  How much fiber do I need? -- The recommended amount of fiber is 20 to 35 grams a day. The nutrition label on packaged foods " "can show you how much fiber you are getting in each serving (figure 1).  How can I make sure I'm getting enough fiber? -- To make sure that you're getting enough fiber, eat plenty of the fruits, vegetables, and grains that contain fiber (table 1 and figure 2). Many breakfast cereals also have a lot of fiber.  If you can't get enough fiber from food, you can add wheat bran to the foods you do eat. Or you can take fiber supplements. These come in the form of powders, wafers, or pills. They include psyllium seed (sample brand names: Metamucil, Konsyl), methylcellulose (sample brand name: Citrucel), polycarbophil (sample brand name: FiberCon), and wheat dextrin (sample brand name: Benefiber). If you take a fiber supplement, be sure to read the label so you know how much to take. If you're not sure, ask your doctor or nurse.  What are the side effects of fiber? -- When you start eating more fiber, your belly might feel bloated, or you might have gas or cramps. You can avoid these side effects by adding fiber to your diet slowly.  Some people feel worse when they eat more fiber or take fiber supplements. If you feel worse after adding more fiber to your diet, you can try decreasing the amount of fiber to see if that helps.  All topics are updated as new evidence becomes available and our peer review process is complete.  This topic retrieved from [employer] on: Feb 28, 2024.  Topic 18579 Version 10.0  Release: 32.2.4 - C32.58  © 2024 UpToDate, Inc. and/or its affiliates. All rights reserved.  figure 1: Nutrition label - Fiber     This is an example of a nutrition label. To figure out how much fiber is in a food, look for the line that says \"Dietary Fiber.\" It's also important to look at the serving size. This food has 7 grams of fiber in each serving, and each serving is 1 cup.  Graphic 78029 Version 8.0  table 1: Amount of fiber in different foods  Food  Serving  Grams of fiber    Fruits    Apple (with skin) 1 medium apple " 4.4   Banana 1 medium banana 3.1   Oranges 1 orange 3.1   Prunes 1 cup, pitted 12.4   Juices    Apple, unsweetened, with added ascorbic acid 1 cup 0.5   Grapefruit, white, canned, sweetened 1 cup 0.2   Grape, unsweetened, with added ascorbic acid 1 cup 0.5   Orange 1 cup 0.7   Vegetables    Cooked   Green beans 1 cup 4.0   Carrots 1/2 cup sliced 2.3   Peas 1 cup 8.8   Potato (baked, with skin) 1 medium potato 3.8   Raw   Cucumber (with peel) 1 cucumber 1.5   Lettuce 1 cup shredded 0.5   Tomato 1 medium tomato 1.5   Spinach 1 cup 0.7   Legumes   Baked beans, canned, no salt added 1 cup 13.9   Kidney beans, canned 1 cup 13.6   Lima beans, canned 1 cup 11.6   Lentils, boiled 1 cup 15.6   Breads, pastas, flours    Bran muffins 1 medium muffin 5.2   Oatmeal, cooked 1 cup 4.0   White bread 1 slice 0.6   Whole-wheat bread 1 slice 1.9   Pasta and rice, cooked   Macaroni 1 cup 2.5   Rice, brown 1 cup 3.5   Rice, white 1 cup 0.6   Spaghetti (regular) 1 cup 2.5   Nuts    Almonds 1/2 cup 8.7   Peanuts 1/2 cup 7.9   To learn how much fiber and other nutrients are in different foods, visit the [place] Department of Agriculture (USDA) FoodData Central website.  Graphic 32454 Version 6.0  figure 2: Foods with fiber     Foods with a lot of fiber include prunes, apples, oranges, bananas, peas, green beans, kidney beans, cooked oatmeal, almonds, peanuts, and whole-wheat bread.  Graphic 57642 Version 1.0  Consumer Information Use and Disclaimer   Disclaimer: This generalized information is a limited summary of diagnosis, treatment, and/or medication information. It is not meant to be comprehensive and should be used as a tool to help the user understand and/or assess potential diagnostic and treatment options. It does NOT include all information about conditions, treatments, medications, side effects, or risks that may apply to a specific patient. It is not intended to be medical advice or a substitute for the medical advice,  diagnosis, or treatment of a health care provider based on the health care provider's examination and assessment of a patient's specific and unique circumstances. Patients must speak with a health care provider for complete information about their health, medical questions, and treatment options, including any risks or benefits regarding use of medications. This information does not endorse any treatments or medications as safe, effective, or approved for treating a specific patient. UpToDate, Inc. and its affiliates disclaim any warranty or liability relating to this information or the use thereof.The use of this information is governed by the Terms of Use, available at https://www.[employer].com/en/know/clinical-effectiveness-terms. 2024© UpToDate, Inc. and its affiliates and/or licensors. All rights reserved.  Copyright   © 2024 UpToDate, Inc. and/or its affiliates. All rights reserved.

## 2024-11-06 ENCOUNTER — TELEPHONE (OUTPATIENT)
Dept: GASTROENTEROLOGY | Facility: CLINIC | Age: 44
End: 2024-11-06

## 2024-11-06 NOTE — TELEPHONE ENCOUNTER
Advised originals are at the [place] office at [phone] if would like to  to please call office. Paperwork in yellow envelope on Bibi's desk.Thank you

## 2024-11-12 NOTE — TELEPHONE ENCOUNTER
Pt wife is returning a call to discuss [facility] paperwork. Call was transferred to Bibi.

## 2025-01-04 ENCOUNTER — APPOINTMENT (EMERGENCY)
Dept: CT IMAGING | Facility: HOSPITAL | Age: 45
End: 2025-01-04
Payer: COMMERCIAL

## 2025-01-04 ENCOUNTER — APPOINTMENT (EMERGENCY)
Dept: RADIOLOGY | Facility: HOSPITAL | Age: 45
End: 2025-01-04
Payer: COMMERCIAL

## 2025-01-04 ENCOUNTER — HOSPITAL ENCOUNTER (EMERGENCY)
Facility: HOSPITAL | Age: 45
Discharge: HOME | End: 2025-01-04
Attending: EMERGENCY MEDICINE | Admitting: EMERGENCY MEDICINE
Payer: COMMERCIAL

## 2025-01-04 VITALS
DIASTOLIC BLOOD PRESSURE: 91 MMHG | HEART RATE: 106 BPM | SYSTOLIC BLOOD PRESSURE: 150 MMHG | OXYGEN SATURATION: 97 % | RESPIRATION RATE: 20 BRPM | TEMPERATURE: 98.6 F

## 2025-01-04 DIAGNOSIS — R91.1 RIGHT UPPER LOBE PULMONARY NODULE: ICD-10-CM

## 2025-01-04 DIAGNOSIS — J98.11 ATELECTASIS, LEFT: ICD-10-CM

## 2025-01-04 DIAGNOSIS — R07.9 CHEST PAIN: Primary | ICD-10-CM

## 2025-01-04 DIAGNOSIS — J90 PLEURAL EFFUSION ON LEFT: ICD-10-CM

## 2025-01-04 LAB
2HR DELTA HS TROPONIN: -1 NG/L
ALBUMIN SERPL BCG-MCNC: 4.4 G/DL (ref 3.5–5)
ALP SERPL-CCNC: 67 U/L (ref 34–104)
ALT SERPL W P-5'-P-CCNC: 19 U/L (ref 7–52)
ANION GAP SERPL CALCULATED.3IONS-SCNC: 8 MMOL/L (ref 4–13)
AST SERPL W P-5'-P-CCNC: 14 U/L (ref 13–39)
ATRIAL RATE: 99 BPM
BASOPHILS # BLD AUTO: 0.05 THOUSANDS/ΜL (ref 0–0.1)
BASOPHILS NFR BLD AUTO: 1 % (ref 0–1)
BILIRUB SERPL-MCNC: 0.52 MG/DL (ref 0.2–1)
BNP SERPL-MCNC: 23 PG/ML (ref 0–100)
BUN SERPL-MCNC: 18 MG/DL (ref 5–25)
CALCIUM SERPL-MCNC: 9 MG/DL (ref 8.4–10.2)
CARDIAC TROPONIN I PNL SERPL HS: 5 NG/L (ref ?–50)
CARDIAC TROPONIN I PNL SERPL HS: 6 NG/L (ref ?–50)
CHLORIDE SERPL-SCNC: 105 MMOL/L (ref 96–108)
CO2 SERPL-SCNC: 28 MMOL/L (ref 21–32)
CREAT SERPL-MCNC: 0.74 MG/DL (ref 0.6–1.3)
D DIMER PPP FEU-MCNC: 0.88 UG/ML FEU
EOSINOPHIL # BLD AUTO: 0.22 THOUSAND/ΜL (ref 0–0.61)
EOSINOPHIL NFR BLD AUTO: 3 % (ref 0–6)
ERYTHROCYTE [DISTWIDTH] IN BLOOD BY AUTOMATED COUNT: 14.4 % (ref 11.6–15.1)
FLUAV AG UPPER RESP QL IA.RAPID: NEGATIVE
FLUBV AG UPPER RESP QL IA.RAPID: NEGATIVE
GFR SERPL CREATININE-BSD FRML MDRD: 111 ML/MIN/1.73SQ M
GLUCOSE SERPL-MCNC: 79 MG/DL (ref 65–140)
HCT VFR BLD AUTO: 45.8 % (ref 36.5–49.3)
HGB BLD-MCNC: 14.8 G/DL (ref 12–17)
IMM GRANULOCYTES # BLD AUTO: 0.02 THOUSAND/UL (ref 0–0.2)
IMM GRANULOCYTES NFR BLD AUTO: 0 % (ref 0–2)
LYMPHOCYTES # BLD AUTO: 2.35 THOUSANDS/ΜL (ref 0.6–4.47)
LYMPHOCYTES NFR BLD AUTO: 29 % (ref 14–44)
MCH RBC QN AUTO: 29.4 PG (ref 26.8–34.3)
MCHC RBC AUTO-ENTMCNC: 32.3 G/DL (ref 31.4–37.4)
MCV RBC AUTO: 91 FL (ref 82–98)
MONOCYTES # BLD AUTO: 0.83 THOUSAND/ΜL (ref 0.17–1.22)
MONOCYTES NFR BLD AUTO: 10 % (ref 4–12)
NEUTROPHILS # BLD AUTO: 4.65 THOUSANDS/ΜL (ref 1.85–7.62)
NEUTS SEG NFR BLD AUTO: 57 % (ref 43–75)
NRBC BLD AUTO-RTO: 0 /100 WBCS
P AXIS: 39 DEGREES
PLATELET # BLD AUTO: 302 THOUSANDS/UL (ref 149–390)
PMV BLD AUTO: 10 FL (ref 8.9–12.7)
POTASSIUM SERPL-SCNC: 3.9 MMOL/L (ref 3.5–5.3)
PR INTERVAL: 150 MS
PROT SERPL-MCNC: 7.4 G/DL (ref 6.4–8.4)
QRS AXIS: 7 DEGREES
QRSD INTERVAL: 86 MS
QT INTERVAL: 330 MS
QTC INTERVAL: 423 MS
RBC # BLD AUTO: 5.03 MILLION/UL (ref 3.88–5.62)
SARS-COV+SARS-COV-2 AG RESP QL IA.RAPID: NEGATIVE
SODIUM SERPL-SCNC: 141 MMOL/L (ref 135–147)
T WAVE AXIS: 21 DEGREES
VENTRICULAR RATE: 99 BPM
WBC # BLD AUTO: 8.12 THOUSAND/UL (ref 4.31–10.16)

## 2025-01-04 PROCEDURE — 96361 HYDRATE IV INFUSION ADD-ON: CPT

## 2025-01-04 PROCEDURE — 71046 X-RAY EXAM CHEST 2 VIEWS: CPT

## 2025-01-04 PROCEDURE — 99285 EMERGENCY DEPT VISIT HI MDM: CPT | Performed by: EMERGENCY MEDICINE

## 2025-01-04 PROCEDURE — 85379 FIBRIN DEGRADATION QUANT: CPT

## 2025-01-04 PROCEDURE — 36415 COLL VENOUS BLD VENIPUNCTURE: CPT

## 2025-01-04 PROCEDURE — 96374 THER/PROPH/DIAG INJ IV PUSH: CPT

## 2025-01-04 PROCEDURE — 99285 EMERGENCY DEPT VISIT HI MDM: CPT

## 2025-01-04 PROCEDURE — 84484 ASSAY OF TROPONIN QUANT: CPT

## 2025-01-04 PROCEDURE — 83880 ASSAY OF NATRIURETIC PEPTIDE: CPT

## 2025-01-04 PROCEDURE — 85025 COMPLETE CBC W/AUTO DIFF WBC: CPT

## 2025-01-04 PROCEDURE — 93005 ELECTROCARDIOGRAM TRACING: CPT

## 2025-01-04 PROCEDURE — 87804 INFLUENZA ASSAY W/OPTIC: CPT | Performed by: EMERGENCY MEDICINE

## 2025-01-04 PROCEDURE — 87811 SARS-COV-2 COVID19 W/OPTIC: CPT | Performed by: EMERGENCY MEDICINE

## 2025-01-04 PROCEDURE — 80053 COMPREHEN METABOLIC PANEL: CPT

## 2025-01-04 PROCEDURE — 71275 CT ANGIOGRAPHY CHEST: CPT

## 2025-01-04 RX ORDER — LIDOCAINE 50 MG/G
1 PATCH TOPICAL ONCE
Status: DISCONTINUED | OUTPATIENT
Start: 2025-01-04 | End: 2025-01-04 | Stop reason: HOSPADM

## 2025-01-04 RX ORDER — ACETAMINOPHEN 10 MG/ML
1000 INJECTION, SOLUTION INTRAVENOUS ONCE
Status: COMPLETED | OUTPATIENT
Start: 2025-01-04 | End: 2025-01-04

## 2025-01-04 RX ADMIN — ACETAMINOPHEN 1000 MG: 10 INJECTION INTRAVENOUS at 16:33

## 2025-01-04 RX ADMIN — IOHEXOL 85 ML: 350 INJECTION, SOLUTION INTRAVENOUS at 17:50

## 2025-01-04 RX ADMIN — SODIUM CHLORIDE 1000 ML: 0.9 INJECTION, SOLUTION INTRAVENOUS at 16:33

## 2025-01-04 RX ADMIN — LIDOCAINE 1 PATCH: 700 PATCH TOPICAL at 16:36

## 2025-01-04 NOTE — ED PROVIDER NOTES
Time reflects when diagnosis was documented in both MDM as applicable and the Disposition within this note       Time User Action Codes Description Comment    1/4/2025  7:30 PM Felixjose luisKvng Add [J98.11] Atelectasis, left     1/4/2025  7:30 PM Felixjose luisKvng Add [J90] Pleural effusion on left     1/4/2025  7:31 PM Felixjose luisKvng Add [R91.1] Right upper lobe pulmonary nodule     1/4/2025  7:31 PM Felixjose luisKvng Add [R07.9] Chest pain     1/4/2025  7:31 PM Felixjose luisKvng Modify [J98.11] Atelectasis, left     1/4/2025  7:31 PM Felixjose luis Kvng Modify [R07.9] Chest pain           ED Disposition       ED Disposition   Discharge    Condition   Stable    Date/Time   Sat Jan 4, 2025  7:34 PM    Comment   Orlin Elena discharge to home/self care.                   Assessment & Plan       Medical Decision Making  44y.o M presenting for CP. Ddx includes but not limited to PE, ACS, PNA, bronchitis, pericarditis. EKG and troponin not indicative of cardiac ischemia or myopericarditis. Pt's exam and history also not c/w pericarditis. CXR negative for cardiomegaly and PNA. Given his tachycardia and pleuritic pain, Ddimer was ordered to r/o PE. Ddimer came back elevated thus necessitating a PE study. CT was negative for PE, but did show atelectasis, pleural effusion, and pulmonary nodule all which could be contributing to his SOB. Additionally, his sx are c/w URI which may be causing his CP and SOB. Pt was encouraged to take deep breaths to prevent worsening of his atelectasis. He was advised to f/u with his PCP regarding his atelectasis, pleural effusion, and pulmonary nodule. Pt felt well and comfortable going home. Strict return precautions discussed.     Amount and/or Complexity of Data Reviewed  Labs: ordered.  Radiology: ordered. Decision-making details documented in ED Course.    Risk  Prescription drug management.        ED Course as of 01/04/25 2212   Sat Jan 04, 2025   1632 XR chest 2 views  IMPRESSION:     No acute  cardiopulmonary disease.        1929 CTA chest pe study  IMPRESSION:     1. No pulmonary embolism.     2. Trace left pleural effusion with left basilar atelectasis.     3. Sub-4 mm right upper lobe pulmonary nodules. Based on current Fleischner Society 2017 Guidelines on incidental pulmonary nodule, optional follow-up CT at 12 months can be considered. Additional small nodules in the lingula and left lower lobe are   unchanged since March 2019, confirming benignity.         Medications   sodium chloride 0.9 % bolus 1,000 mL (0 mL Intravenous Stopped 1/4/25 1733)   acetaminophen (Ofirmev) injection 1,000 mg (0 mg Intravenous Stopped 1/4/25 1648)   iohexol (OMNIPAQUE) 350 MG/ML injection (SINGLE-DOSE) 85 mL (85 mL Intravenous Given 1/4/25 1750)       ED Risk Strat Scores   HEART Risk Score      Flowsheet Row Most Recent Value   Heart Score Risk Calculator    History 1 Filed at: 01/04/2025 1930   ECG 0 Filed at: 01/04/2025 1930   Age 0 Filed at: 01/04/2025 1930   Risk Factors 1 Filed at: 01/04/2025 1930   Troponin 0 Filed at: 01/04/2025 1930   HEART Score 2 Filed at: 01/04/2025 1930          HEART Risk Score      Flowsheet Row Most Recent Value   Heart Score Risk Calculator    History 1 Filed at: 01/04/2025 1930   ECG 0 Filed at: 01/04/2025 1930   Age 0 Filed at: 01/04/2025 1930   Risk Factors 1 Filed at: 01/04/2025 1930   Troponin 0 Filed at: 01/04/2025 1930   HEART Score 2 Filed at: 01/04/2025 1930                            SBIRT 22yo+      Flowsheet Row Most Recent Value   Initial Alcohol Screen: US AUDIT-C     1. How often do you have a drink containing alcohol? 0 Filed at: 01/04/2025 1637   2. How many drinks containing alcohol do you have on a typical day you are drinking?  0 Filed at: 01/04/2025 1637   3a. Male UNDER 65: How often do you have five or more drinks on one occasion? 0 Filed at: 01/04/2025 1637   3b. FEMALE Any Age, or MALE 65+: How often do you have 4 or more drinks on one occassion? 0 Filed at:  "2025 1637   Audit-C Score 0 Filed at: 2025 1637   JANETH: How many times in the past year have you...    Used an illegal drug or used a prescription medication for non-medical reasons? Never Filed at: 2025 1637            Leonid' Criteria for PE      Flowsheet Row Most Recent Value   Wells' Criteria for PE    Clinical signs and symptoms of DVT 0 Filed at: 2025 1559   PE is primary diagnosis or equally likely 3 Filed at: 2025 1559   HR >100 1.5 Filed at: 2025 1559   Immobilization at least 3 days or Surgery in the previous 4 weeks 0 Filed at: 2025 1559   Previous, objectively diagnosed PE or DVT 0 Filed at: 2025 1559   Hemoptysis 0 Filed at: 2025 1559   Malignancy with treatment within 6 months or palliative 0 Filed at: 2025 1559   Wells' Criteria Total 4.5 Filed at: 2025 1559                        History of Present Illness       Chief Complaint   Patient presents with    Chest Pain     Pt reports sharp L-sided CP/SOB and states, \"at first I thought I was getting pneumonia or bronchitis\". Reports a cough x 2.5 days. Pt reports tension across chest gets worse with exertion      Cough       Past Medical History:   Diagnosis Date    Diverticulitis of colon     Diverticulosis       Past Surgical History:   Procedure Laterality Date    COLONOSCOPY      US GUIDED VASCULAR ACCESS  10/16/2015      Family History   Problem Relation Age of Onset    Cancer Paternal Grandfather       Social History     Tobacco Use    Smoking status: Former     Current packs/day: 0.00     Types: Cigarettes     Quit date:      Years since quittin.0    Smokeless tobacco: Never   Vaping Use    Vaping status: Every Day    Substances: Nicotine   Substance Use Topics    Alcohol use: Yes     Comment: social    Drug use: Never      E-Cigarette/Vaping    E-Cigarette Use Current Every Day User       E-Cigarette/Vaping Substances    Nicotine Yes     THC No     CBD No       I have " reviewed and agree with the history as documented.     44y.o M presenting for CP. 3d ago Pt developed sudden onset sharp left-sided chest pain worse with lifting, coughing, and deep inhalation. Has had associated SOB, chills, productive cough, rhinorrhea. Although he does not currently have lower extremity edema, he states that occasionally his left lower leg would swell up which he thought was due to a knee surgery he had years ago. Endorses heavy lifting at work. Denies PE risk factors, LE pain, fever, N/V, abdominal pain, orthopnea, pain worse when laying flat or better when leaning forward, palpitations.       History provided by:  Patient      Review of Systems   Constitutional:  Negative for activity change, appetite change and fever.   HENT:  Positive for rhinorrhea.    Respiratory:  Positive for cough and shortness of breath.    Cardiovascular:  Positive for chest pain. Negative for palpitations and leg swelling.   Gastrointestinal:  Negative for abdominal pain, nausea and vomiting.           Objective       ED Triage Vitals [01/04/25 1349]   Temperature Pulse Blood Pressure Respirations SpO2 Patient Position - Orthostatic VS   98.6 °F (37 °C) (!) 106 150/91 20 97 % Sitting      Temp Source Heart Rate Source BP Location FiO2 (%) Pain Score    Oral Monitor Right arm -- No Pain      Vitals      Date and Time Temp Pulse SpO2 Resp BP Pain Score FACES Pain Rating User   01/04/25 1349 98.6 °F (37 °C) 106 97 % 20 150/91 No Pain -- DO            Physical Exam  Constitutional:       General: He is not in acute distress.     Appearance: Normal appearance.   HENT:      Nose: Nose normal. No rhinorrhea.      Mouth/Throat:      Mouth: Mucous membranes are moist.      Pharynx: Oropharynx is clear.   Eyes:      Extraocular Movements: Extraocular movements intact.      Conjunctiva/sclera: Conjunctivae normal.   Cardiovascular:      Rate and Rhythm: Regular rhythm. Tachycardia present.      Pulses: Normal pulses.      Heart  sounds: Normal heart sounds.   Pulmonary:      Effort: Pulmonary effort is normal.      Breath sounds: Normal breath sounds.   Abdominal:      General: Abdomen is flat.      Palpations: Abdomen is soft.   Musculoskeletal:         General: No tenderness.      Right lower leg: No edema.      Left lower leg: No edema.   Skin:     General: Skin is warm and dry.      Capillary Refill: Capillary refill takes less than 2 seconds.   Neurological:      General: No focal deficit present.      Mental Status: He is alert and oriented to person, place, and time.         Results Reviewed       Procedure Component Value Units Date/Time    HS Troponin I 2hr [[id number]]  (Normal) Collected: 01/04/25 1828    Lab Status: Final result Specimen: Blood from Arm, Right Updated: 01/04/25 1914     hs TnI 2hr 5 ng/L      Delta 2hr hsTnI -1 ng/L     B-Type Natriuretic Peptide(BNP) [[id number]]  (Normal) Collected: 01/04/25 1637    Lab Status: Final result Specimen: Blood from Hand, Left Updated: 01/04/25 1819     BNP 23 pg/mL     HS Troponin 0hr (reflex protocol) [[id number]]  (Normal) Collected: 01/04/25 1637    Lab Status: Final result Specimen: Blood from Hand, Left Updated: 01/04/25 1721     hs TnI 0hr 6 ng/L     D-Dimer [[id number]]  (Abnormal) Collected: 01/04/25 1637    Lab Status: Final result Specimen: Blood from Arm, Left Updated: 01/04/25 1720     D-Dimer, Quant 0.88 ug/ml U     Comprehensive metabolic panel [[id number]] Collected: 01/04/25 1637    Lab Status: Final result Specimen: Blood from Hand, Left Updated: 01/04/25 1716     Sodium 141 mmol/L      Potassium 3.9 mmol/L      Chloride 105 mmol/L      CO2 28 mmol/L      ANION GAP 8 mmol/L      BUN 18 mg/dL      Creatinine 0.74 mg/dL      Glucose 79 mg/dL      Calcium 9.0 mg/dL      AST 14 U/L      ALT 19 U/L      Alkaline Phosphatase 67 U/L      Total Protein 7.4 g/dL      Albumin 4.4 g/dL      Total Bilirubin 0.52 mg/dL      eGFR 111 ml/min/1.73sq m     Narrative:      National  Kidney Disease Foundation guidelines for Chronic Kidney Disease (CKD):     Stage 1 with normal or high GFR (GFR > 90 mL/min/1.73 square meters)    Stage 2 Mild CKD (GFR = 60-89 mL/min/1.73 square meters)    Stage 3A Moderate CKD (GFR = 45-59 mL/min/1.73 square meters)    Stage 3B Moderate CKD (GFR = 30-44 mL/min/1.73 square meters)    Stage 4 Severe CKD (GFR = 15-29 mL/min/1.73 square meters)    Stage 5 End Stage CKD (GFR <15 mL/min/1.73 square meters)  Note: GFR calculation is accurate only with a steady state creatinine    CBC and differential [[id number]] Collected: 01/04/25 1637    Lab Status: Final result Specimen: Blood from Hand, Left Updated: 01/04/25 1656     WBC 8.12 Thousand/uL      RBC 5.03 Million/uL      Hemoglobin 14.8 g/dL      Hematocrit 45.8 %      MCV 91 fL      MCH 29.4 pg      MCHC 32.3 g/dL      RDW 14.4 %      MPV 10.0 fL      Platelets 302 Thousands/uL      nRBC 0 /100 WBCs      Segmented % 57 %      Immature Grans % 0 %      Lymphocytes % 29 %      Monocytes % 10 %      Eosinophils Relative 3 %      Basophils Relative 1 %      Absolute Neutrophils 4.65 Thousands/µL      Absolute Immature Grans 0.02 Thousand/uL      Absolute Lymphocytes 2.35 Thousands/µL      Absolute Monocytes 0.83 Thousand/µL      Eosinophils Absolute 0.22 Thousand/µL      Basophils Absolute 0.05 Thousands/µL     FLU/COVID Rapid Antigen (30 min. TAT) - Preferred screening test in ED [[id number]]  (Normal) Collected: 01/04/25 1351    Lab Status: Final result Specimen: Nares from Nose Updated: 01/04/25 1416     SARS COV Rapid Antigen Negative     Influenza A Rapid Antigen Negative     Influenza B Rapid Antigen Negative    Narrative:      This test has been performed using the Quidel Jill 2 FLU+SARS Antigen test under the Emergency Use Authorization (EUA). This test has been validated by the  and verified by the performing laboratory. The Jill uses lateral flow immunofluorescent sandwich assay to detect SARS-COV,  Influenza A and Influenza B Antigen.     The Quidel Jill 2 SARS Antigen test does not differentiate between SARS-CoV and SARS-CoV-2.     Negative results are presumptive and may be confirmed with a molecular assay, if necessary, for patient management. Negative results do not rule out SARS-CoV-2 or influenza infection and should not be used as the sole basis for treatment or patient management decisions. A negative test result may occur if the level of antigen in a sample is below the limit of detection of this test.     Positive results are indicative of the presence of viral antigens, but do not rule out bacterial infection or co-infection with other viruses.     All test results should be used as an adjunct to clinical observations and other information available to the provider.    FOR PEDIATRIC PATIENTS - copy/paste COVID Guidelines URL to browser: [email or web address]            CTA chest pe study   Final Interpretation by Sweta Bedolla MD (01/04 1922)      1. No pulmonary embolism.      2. Trace left pleural effusion with left basilar atelectasis.      3. Sub-4 mm right upper lobe pulmonary nodules. Based on current Fleischner Society 2017 Guidelines on incidental pulmonary nodule, optional follow-up CT at 12 months can be considered. Additional small nodules in the lingula and left lower lobe are    unchanged since March 2019, confirming benignity.      Workstation performed: [id number]         XR chest 2 views   Final Interpretation by Blake Carpio MD (01/04 6128)      No acute cardiopulmonary disease.            Workstation performed: [id number]             ECG 12 Lead Documentation Only    Date/Time: 1/4/2025 3:32 PM    Performed by: Kvng Mcneal MD  Authorized by: Kvng Mcneal MD    ECG reviewed by me, the ED Provider: yes    Patient location:  ED  Interpretation:     Interpretation: abnormal    Rate:     ECG rate:  99    ECG  rate assessment: normal    Rhythm:     Rhythm: sinus rhythm    Ectopy:     Ectopy: none    QRS:     QRS axis:  Normal    QRS intervals:  Normal  Conduction:     Conduction: abnormal      Abnormal conduction: incomplete LBBB    ST segments:     ST segments:  Normal  T waves:     T waves: normal        ED Medication and Procedure Management   Prior to Admission Medications   Prescriptions Last Dose Informant Patient Reported? Taking?   ketorolac (TORADOL) 10 mg tablet  Self Yes No   Sig: Take 10 mg by mouth every 6 (six) hours as needed   Patient not taking: Reported on 6/3/2024   mesalamine (ASACOL) 800 MG EC tablet   No No   Sig: Take 1 tablet (800 mg total) by mouth 3 (three) times a day for 7 days   olopatadine (PATANOL) 0.1 % ophthalmic solution  Self Yes No   Sig: Apply 1 drop to eye 2 (two) times a day   Patient not taking: Reported on 9/9/2024   ondansetron (ZOFRAN) 4 mg tablet   No No   Sig: Take 1 tablet (4 mg total) by mouth every 8 (eight) hours as needed for nausea or vomiting for up to 7 days   Patient not taking: Reported on 9/9/2024      Facility-Administered Medications: None     Discharge Medication List as of 1/4/2025  7:36 PM        CONTINUE these medications which have NOT CHANGED    Details   ketorolac (TORADOL) 10 mg tablet Take 10 mg by mouth every 6 (six) hours as needed, Starting Wed 3/8/2023, Historical Med      mesalamine (ASACOL) 800 MG EC tablet Take 1 tablet (800 mg total) by mouth 3 (three) times a day for 7 days, Starting Mon 10/28/2024, Until Mon 11/4/2024, Normal      olopatadine (PATANOL) 0.1 % ophthalmic solution Apply 1 drop to eye 2 (two) times a day, Starting Fri 4/12/2024, Until Sat 4/12/2025, Historical Med      ondansetron (ZOFRAN) 4 mg tablet Take 1 tablet (4 mg total) by mouth every 8 (eight) hours as needed for nausea or vomiting for up to 7 days, Starting Wed 2/15/2023, Until Wed 2/22/2023 at 2359, Normal           No discharge procedures on file.  ED SEPSIS DOCUMENTATION    Time reflects when diagnosis was documented in both MDM as applicable and the Disposition within this note       Time User Action Codes Description Comment    1/4/2025  7:30 PM Kvng Mcneal [J98.11] Atelectasis, left     1/4/2025  7:30 PM Kvng Mcneal [J90] Pleural effusion on left     1/4/2025  7:31 PM Kvng Mcneal [R91.1] Right upper lobe pulmonary nodule     1/4/2025  7:31 PM Kvng Mcneal [R07.9] Chest pain     1/4/2025  7:31 PM Kvng Mcneal [J98.11] Atelectasis, left     1/4/2025  7:31 PM Kvng Mcneal [R07.9] Chest pain                  Kvng Mcneal MD  01/04/25 4759

## 2025-01-05 NOTE — DISCHARGE INSTRUCTIONS
Follow up with your PCP regarding your pleural effusion and pulmonary nodule to have this investigated further.     Return to the ER if you develop:    Worsneing shortness of breath, worsening chest pain with arm pain, jaw pain, sweats, lower leg swelling, coughing up blood.    normal...

## 2025-01-30 LAB
ATRIAL RATE: 99 BPM
P AXIS: 39 DEGREES
PR INTERVAL: 150 MS
QRS AXIS: 7 DEGREES
QRSD INTERVAL: 86 MS
QT INTERVAL: 330 MS
QTC INTERVAL: 423 MS
T WAVE AXIS: 21 DEGREES
VENTRICULAR RATE: 99 BPM

## 2025-04-10 ENCOUNTER — TELEPHONE (OUTPATIENT)
Age: 45
End: 2025-04-10

## 2025-04-10 NOTE — TELEPHONE ENCOUNTER
Contacted patient off of [facility]  wait list to verify needs of services in attempts to update list with patient preferences. LVM for patient to contact intake dept  in regards to updating wait list with preferences at [phone]. 1st attempt.

## 2025-06-24 ENCOUNTER — TELEPHONE (OUTPATIENT)
Age: 45
End: 2025-06-24

## 2025-06-24 NOTE — TELEPHONE ENCOUNTER
Contacted patient in regards to [facility] program in attempts to discuss services and schedule appointment. LVM to contact [phone] for patient to call back.    Upon return call, please warm transfer to writer or Shagufta GROSSMAN

## 2025-06-25 NOTE — TELEPHONE ENCOUNTER
Contacted patient to offer VIRTUAL ONLY services through [facility]. [facility] for patient to return call.    Please confirm if patient is interested in VIRTUAL ONLY therapy    Upon c/b, please transfer to writer or Sofía PAL

## 2025-06-26 NOTE — TELEPHONE ENCOUNTER
Contacted patient in regards to [facility] program in attempts to discuss services and schedule appointment. LVM to contact [phone] for patient to call back.    Upon return call, please warm transfer to writer or Shagufta GROSSMAN    Removing from [facility] waitlist due to no answer.